# Patient Record
Sex: MALE | Race: WHITE | HISPANIC OR LATINO | Employment: FULL TIME | URBAN - METROPOLITAN AREA
[De-identification: names, ages, dates, MRNs, and addresses within clinical notes are randomized per-mention and may not be internally consistent; named-entity substitution may affect disease eponyms.]

---

## 2017-10-06 ENCOUNTER — ALLSCRIPTS OFFICE VISIT (OUTPATIENT)
Dept: OTHER | Facility: OTHER | Age: 42
End: 2017-10-06

## 2018-01-10 NOTE — PROGRESS NOTES
Chief Complaint  Patient presented for a flu shot      Active Problems    1  Allergic rhinitis (477 9) (J30 9)   2  Chronic lower back pain (724 2,338 29) (M54 5,G89 29)   3  Chronic right-sided low back pain without sciatica (724 2,338 29) (M54 5,G89 29)   4  Herpes simplex type 1 infection (054 9) (B00 9)   5  History of Plantar wart, left foot (078 12) (B07 0)    Current Meds   1  Cyclobenzaprine HCl - 10 MG Oral Tablet; Take 1 tablet twice daily; Therapy: 20Woc7203 to (Evaluate:13Jun2017)  Requested for: 49KMQ8674; Last   Rx:08Jun2017 Ordered   2  Mometasone Furoate 50 MCG/ACT Nasal Suspension; Inhale 2 sprays in each nostril   once daily; Therapy: 05PEC9247 to (Evaluate:83Ppw3185)  Requested for: 33YDQ5997; Last   Rx:62Cki7227 Ordered   3  Zovirax 5 % External Cream; apply 5 xday x 4 days; Therapy: 50ZKQ7778 to (Last Rx:64Fqc2055)  Requested for: 44LFD2193 Ordered    Allergies    1   Penicillins    Plan  Influenza vaccine needed    · Fluzone Quadrivalent Intramuscular Suspension    Signatures   Electronically signed by : Germania Israel, ; Oct  6 2017  2:37PM EST                       (Author)    Electronically signed by : Keisha Mccarty DO; Oct  6 2017  4:08PM EST                       (Author)

## 2018-01-18 NOTE — PROGRESS NOTES
Chief Complaint  PT PRESENTS FOR FLU VACCINE, TOLERATED WELL      Active Problems    1  Acute bronchitis (466 0) (J20 9)   2  Allergic conjunctivitis (372 14) (H10 10)   3  Allergic rhinitis (477 9) (J30 9)   4  Back spasm (724 8) (M62 830)   5  Cough (786 2) (R05)   6  Herpes simplex type 1 infection (054 9) (B00 9)   7  Need for prophylactic vaccination and inoculation against influenza (V04 81) (Z23)    Current Meds   1  Azithromycin 250 MG Oral Tablet; Take 2 tabs day one, then 1 tab daily x 4 days to finish; Therapy: 71RXQ6331 to (Last Rx:04Nov2016)  Requested for: 52JOW4632 Ordered   2  Cyclobenzaprine HCl - 10 MG Oral Tablet; Take 1 tablet twice daily; Therapy: 06Rcr9708 to (Sandie Loaiza)  Requested for: 10AYS4666; Last   Rx:52Byy1670 Ordered   3  Diclofenac Sodium 50 MG Oral Tablet Delayed Release; TAKE 1 TABLET Every 8 hours; Therapy: 49Kae5506 to (Evaluate:45Ypx5513)  Requested for: 90Nnx5162; Last   Rx:28Zcd7071 Ordered   4  Montelukast Sodium 10 MG Oral Tablet; 1 po daily; Therapy: 20Wed0180 to (Last Rx:62Haf2398)  Requested for: 77Sya4673 Ordered   5  Promethazine VC Plain 6 25-5 MG/5ML Oral Syrup; TAKE 5 ML EVERY 4 TO 6 HOURS   AS NEEDED; Therapy: 56MNQ8074 to (Burgess Aggarwal)  Requested for: 06BCA3760; Last   Rx:04Nov2016 Ordered   6  Zovirax 5 % External Cream; Apply to affected area 5 times a day for 4 days; Therapy: 17Xrc7670 to (Evaluate:54Uuq0301)  Requested for: 00Zvq1227; Last   Rx:00Gef2428 Ordered    Allergies    1  Penicillins    Plan  Need for prophylactic vaccination and inoculation against influenza    · Fluzone Quadrivalent Intramuscular Suspension    Signatures   Electronically signed by :  Oddis Romberg, ; Dec  2 2016  9:13AM EST                       (Author)    Electronically signed by : Justa Giraldo DO; Dec  2 2016 12:44PM EST                       (Author)

## 2018-03-05 ENCOUNTER — OFFICE VISIT (OUTPATIENT)
Dept: FAMILY MEDICINE CLINIC | Facility: CLINIC | Age: 43
End: 2018-03-05
Payer: COMMERCIAL

## 2018-03-05 VITALS
OXYGEN SATURATION: 98 % | DIASTOLIC BLOOD PRESSURE: 72 MMHG | WEIGHT: 215 LBS | HEIGHT: 71 IN | HEART RATE: 96 BPM | RESPIRATION RATE: 16 BRPM | BODY MASS INDEX: 30.1 KG/M2 | SYSTOLIC BLOOD PRESSURE: 124 MMHG

## 2018-03-05 DIAGNOSIS — L30.9 DERMATITIS: ICD-10-CM

## 2018-03-05 DIAGNOSIS — Z00.00 ANNUAL PHYSICAL EXAM: Primary | ICD-10-CM

## 2018-03-05 DIAGNOSIS — M54.50 CHRONIC RIGHT-SIDED LOW BACK PAIN WITHOUT SCIATICA: ICD-10-CM

## 2018-03-05 DIAGNOSIS — G89.29 CHRONIC RIGHT-SIDED LOW BACK PAIN WITH RIGHT-SIDED SCIATICA: ICD-10-CM

## 2018-03-05 DIAGNOSIS — M54.41 CHRONIC RIGHT-SIDED LOW BACK PAIN WITH RIGHT-SIDED SCIATICA: ICD-10-CM

## 2018-03-05 DIAGNOSIS — G89.29 CHRONIC RIGHT-SIDED LOW BACK PAIN WITHOUT SCIATICA: ICD-10-CM

## 2018-03-05 PROCEDURE — 99396 PREV VISIT EST AGE 40-64: CPT | Performed by: FAMILY MEDICINE

## 2018-03-05 PROCEDURE — 99213 OFFICE O/P EST LOW 20 MIN: CPT | Performed by: FAMILY MEDICINE

## 2018-03-05 RX ORDER — CYCLOBENZAPRINE HCL 10 MG
10 TABLET ORAL 3 TIMES DAILY PRN
Qty: 90 TABLET | Refills: 1 | Status: SHIPPED | OUTPATIENT
Start: 2018-03-05 | End: 2018-06-19

## 2018-03-05 NOTE — PROGRESS NOTES
Subjective:      Patient ID: Alvin Fox is a 40 y o  male  56-year-old male presents for annual physical examination  He generally feels well  Every now and then when he is twisting a certain way or in an awkward position he will get a spasm and pain in his back  He does have p r n  Flexeril left over from over 1 year ago which she will take intermittently which does provide relief  He was given referral to physical therapy but never pursue them  Patient also will occasionally get dermatitis on his upper lip especially after shaving  No blisters  He was applying topical Zovirax which does seem to provide some relief but never had a history of herpes simplex  Patient does need screening blood work  No other complaints or concerns        Past Medical History:   Diagnosis Date    Allergic rhinitis     Onset: 4/28/2008    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/2012    Diabetes ([address] )     Onset: 4/14/2009    Hordeolum externum unspecified eye, unspecified eyelid     Onset: 6/30/2009    IBS (irritable bowel syndrome)     Onset: 5/23/2008    Impaired fasting glucose     Onset: 4/28/2009    Plantar wart, left foot     Last Assessed: 12/29/2016    Rosacea     Onset: 3/21/2006       Family History   Problem Relation Age of Onset    No Known Problems Mother        History reviewed  No pertinent surgical history  reports that he has never smoked  He has never used smokeless tobacco     No current outpatient prescriptions on file  The following portions of the patient's history were reviewed and updated as appropriate: allergies, current medications, past family history, past medical history, past social history, past surgical history and problem list     Review of Systems   Constitutional: Negative  HENT: Negative  Eyes: Negative  Respiratory: Negative  Cardiovascular: Negative  Gastrointestinal: Negative  Endocrine: Negative  Genitourinary: Negative  Musculoskeletal: Positive for back pain ( intermittent, sporadic back pain without referral of pain)  Skin: Negative  Allergic/Immunologic: Negative  Neurological: Negative  Hematological: Negative  Psychiatric/Behavioral: Negative  All other systems reviewed and are negative  Objective:    /72 (BP Location: Left arm, Patient Position: Sitting, Cuff Size: Standard)   Pulse 96   Resp 16   Ht 5' 11" (1 803 m)   Wt 97 5 kg (215 lb)   SpO2 98%   BMI 29 99 kg/m²      Physical Exam   Constitutional: He is oriented to person, place, and time  He appears well-developed and well-nourished  HENT:   Head: Normocephalic  Eyes: Conjunctivae and EOM are normal  Pupils are equal, round, and reactive to light  Neck: Normal range of motion  Neck supple  Cardiovascular: Normal rate, regular rhythm and normal heart sounds  Pulmonary/Chest: Effort normal and breath sounds normal    Abdominal: Soft  Bowel sounds are normal    Musculoskeletal: Normal range of motion  Neurological: He is alert and oriented to person, place, and time  Skin: Skin is warm and dry  Psychiatric: He has a normal mood and affect  His behavior is normal  Judgment and thought content normal    Nursing note and vitals reviewed  No results found for this or any previous visit (from the past 1008 hour(s))  Assessment/Plan:    No problem-specific Assessment & Plan notes found for this encounter  Problem List Items Addressed This Visit     Chronic lower back pain       Patient given a refill of p r n  Flexeril  Referral to physical therapy which she will now try  I am certain that he only needs 2 or 3 sessions to get stretching and strengthening exercises in  Once he is given a regimen I am certain that he will be able to follow it on his own           Relevant Medications    cyclobenzaprine (FLEXERIL) 10 mg tablet    Other Relevant Orders    Ambulatory referral to Physical Therapy Ambulatory referral to Physical Therapy    Chronic right-sided low back pain without sciatica    Relevant Medications    cyclobenzaprine (FLEXERIL) 10 mg tablet    Other Relevant Orders    Ambulatory referral to Physical Therapy    Annual physical exam - Primary      Generally healthy 45-year-old male  He could stand to lose some weight  Check screening blood work including CBC, CMP, TSH, lipid profile  Will contact patient with results  Relevant Orders    CBC and differential    Comprehensive metabolic panel    TSH, 3rd generation with T4 reflex    Lipid panel    Dermatitis       Razor dermatitis  Patient was advised to shave with the growth of hair as opposed to against it  If he does developed slight dermatitis instead of applying Zovirax, I did give him a sample of [facility]    The topical acyclovir probably provided relief because of the moisturizing nature but he does not have herpes simplex

## 2018-03-05 NOTE — ASSESSMENT & PLAN NOTE
Generally healthy 78-year-old male  He could stand to lose some weight  Check screening blood work including CBC, CMP, TSH, lipid profile  Will contact patient with results

## 2018-03-05 NOTE — ASSESSMENT & PLAN NOTE
Patient given a refill of p r n  Flexeril  Referral to physical therapy which she will now try  I am certain that he only needs 2 or 3 sessions to get stretching and strengthening exercises in  Once he is given a regimen I am certain that he will be able to follow it on his own

## 2018-03-05 NOTE — ASSESSMENT & PLAN NOTE
Razor dermatitis  Patient was advised to shave with the growth of hair as opposed to against it  If he does developed slight dermatitis instead of applying Zovirax, I did give him a sample of [facility]    The topical acyclovir probably provided relief because of the moisturizing nature but he does not have herpes simplex

## 2018-03-07 ENCOUNTER — APPOINTMENT (OUTPATIENT)
Dept: PHYSICAL THERAPY | Facility: CLINIC | Age: 43
End: 2018-03-07
Payer: COMMERCIAL

## 2018-03-19 ENCOUNTER — EVALUATION (OUTPATIENT)
Dept: PHYSICAL THERAPY | Facility: CLINIC | Age: 43
End: 2018-03-19
Payer: COMMERCIAL

## 2018-03-19 DIAGNOSIS — G89.29 CHRONIC LOW BACK PAIN WITH RIGHT-SIDED SCIATICA, UNSPECIFIED BACK PAIN LATERALITY: Primary | ICD-10-CM

## 2018-03-19 DIAGNOSIS — M54.41 CHRONIC LOW BACK PAIN WITH RIGHT-SIDED SCIATICA, UNSPECIFIED BACK PAIN LATERALITY: Primary | ICD-10-CM

## 2018-03-19 PROCEDURE — G8978 MOBILITY CURRENT STATUS: HCPCS

## 2018-03-19 PROCEDURE — 97112 NEUROMUSCULAR REEDUCATION: CPT

## 2018-03-19 PROCEDURE — 97110 THERAPEUTIC EXERCISES: CPT

## 2018-03-19 PROCEDURE — G8979 MOBILITY GOAL STATUS: HCPCS

## 2018-03-19 PROCEDURE — 97161 PT EVAL LOW COMPLEX 20 MIN: CPT

## 2018-03-19 NOTE — PROGRESS NOTES
PT Evaluation     Today's date: 3/19/2018  Patient name: Luba Glez  : 1975  MRN: [id number]  Referring provider: Javan Zurita DO  Dx: No diagnosis found  Assessment  Impairments: abnormal coordination, abnormal muscle firing, abnormal muscle tone, abnormal or restricted ROM, abnormal movement, activity intolerance, impaired physical strength, lacks appropriate home exercise program and pain with function    Assessment details: PT is a 40y o  year old male with the impairments listed above  Patient will benefit from skilled physical therapy to address impairments and maximize function  Thank you for the referral   Understanding of Dx/Px/POC: good   Prognosis: good    Goals  Impairment Related Goals: In 8 weeks, patient will have gross hip strength improve minimum 50% or minimum 4+/5 MMT grade  In 6 weeks, patient will have improvement in lumbar extension AROM minimum 25%  In 2 weeks, patient will verbalize and demonstrate positional considerations to improve sitting posture  Functional Goals: In 8 weeks, patient will verbalize no difficulty with lifting groceries, other moderate activities around the home  In 8 weeks, patient will have no difficulty with usual housework  In 6 weeks, patient will report no pain/ compensation/ difficulty with regular recreation         Plan  Patient would benefit from: skilled PT  Planned modality interventions: cryotherapy, TENS and thermotherapy: hydrocollator packs  Planned therapy interventions: joint mobilization, manual therapy, massage, neuromuscular re-education, patient education, postural training, body mechanics training, behavior modification, balance, strengthening, stretching, therapeutic exercise, community reintegration, flexibility, gait training, graded activity, graded exercise, home exercise program, ADL training, activity modification, abdominal trunk stabilization, IADL retraining and motor coordination training  Frequency: 2x week  Duration in weeks: 8  Treatment plan discussed with: patient        Subjective Evaluation    History of Present Illness  Mechanism of injury: HX OF CURRENT CONDITION: Patient reports history of low back pain with random onset > 15 years  He reports last week he was bent over fixing the faucet and had the pain shoot up his back when he tried to stand back up  He reports he has difficulty straightening up when the incidences  He reports approximately once per year he has a flare up that his pain is so debilitating he cannot get out of bed or walk  He describes the pain as "paralyzing and sharp in nature " He reports he doesn't have pain with sitting, standing, laying  He reports the onsets tend to be when he's flexed over or completing heavy lifting  He denies pain in his legs, reporting the pain shoots "up his back " Denies hip or knee pain  SPECIAL QUESTIONS: Denies numbness/ tingling, saddle anesthesia, bowel and bladder changes  OCCUPATION: Patient works for "[employer] he reports a lot of sitting for work  HOBBIES: Basketball, sports with kids  PRECAUTIONS: None      Pain  Current pain ratin  At best pain ratin  At worst pain ratin  Quality: sharp    Patient Goals  Patient goals for therapy: increased strength  Patient goal: "core strength"        Objective     Active Range of Motion     Lumbar   Flexion: 95 degrees   Extension: 50 degrees   Left lateral flexion: 100 degrees   Right lateral flexion: 90 degrees     Additional Active Range of Motion Details  Lower Quarter Screen was negative  Light touch intact in L2-S1 dermatomes bilaterally  Knee jerk reflex 2+ bilaterally, ankle jerk reflex 2+ bilaterally    No pain with lumbar AROM        Strength/Myotome Testing     Left Hip   Planes of Motion   Extension: 4-  Abduction: 4    Right Hip   Planes of Motion   Extension: 4-  Abduction: 4    Additional Strength Details  Hip PROM WNL and symmetrical, slight limitation in IR on the R  Negative Hamstring, Mary's, Piriformis    Tests     Lumbar     Left   Negative passive SLR and vertical compression  Right   Negative passive SLR and vertical compression             Precautions: None    Daily Treatment Diary     Manual  3/19            P-A mobilizations             MWM PPU                                                        Exercise Diary              Prone Press Ups X 10            Hip abduction             Hip ER             Glute Bridges             Transverse Abdominus                                                                                                                                                                                                                    Modalities

## 2018-03-23 ENCOUNTER — OFFICE VISIT (OUTPATIENT)
Dept: PHYSICAL THERAPY | Facility: CLINIC | Age: 43
End: 2018-03-23
Payer: COMMERCIAL

## 2018-03-23 DIAGNOSIS — M54.41 CHRONIC LOW BACK PAIN WITH RIGHT-SIDED SCIATICA, UNSPECIFIED BACK PAIN LATERALITY: Primary | ICD-10-CM

## 2018-03-23 DIAGNOSIS — G89.29 CHRONIC LOW BACK PAIN WITH RIGHT-SIDED SCIATICA, UNSPECIFIED BACK PAIN LATERALITY: Primary | ICD-10-CM

## 2018-03-23 PROCEDURE — 97112 NEUROMUSCULAR REEDUCATION: CPT

## 2018-03-23 PROCEDURE — 97110 THERAPEUTIC EXERCISES: CPT

## 2018-03-23 PROCEDURE — 97140 MANUAL THERAPY 1/> REGIONS: CPT

## 2018-03-23 NOTE — PROGRESS NOTES
Daily Note     Today's date: 3/23/2018  Patient name: Jacki Nice  : 1975  MRN: [id number]  Referring provider: Adriane Wright DO  Dx:   Encounter Diagnosis     ICD-10-CM    1  Chronic low back pain with right-sided sciatica, unspecified back pain laterality M54 41     G89 29                   Subjective: Patient reports a little bit of soreness with shoveling, but no debilitating pain  Objective: See treatment diary below      Precautions: None    Daily Treatment Diary     Manual  3/23            P-A mobilizations             MWM PPU                                                        Exercise Diary              Prone Press Ups X 10            Hip abduction walking GTB 2 x 10            Hip ER 2 x 10            Glute Bridges X 20            Transverse Abdominus X 15            PN w/ SLR X 10            Multifidus Press  BTB x 15            Balance Board             Obliques             Crunches                                                                                                                                                   Modalities                                                         Assessment: Tolerated treatment well  Moderate fatigue with various Te  Patient demonstrated fatigue post treatment, exhibited good technique with therapeutic exercises and would benefit from continued PT      Plan: Give additional HEP print out NV  Continue per plan of care  Progress treatment as tolerated

## 2018-03-26 ENCOUNTER — OFFICE VISIT (OUTPATIENT)
Dept: PHYSICAL THERAPY | Facility: CLINIC | Age: 43
End: 2018-03-26
Payer: COMMERCIAL

## 2018-03-26 DIAGNOSIS — M54.41 CHRONIC LOW BACK PAIN WITH RIGHT-SIDED SCIATICA, UNSPECIFIED BACK PAIN LATERALITY: Primary | ICD-10-CM

## 2018-03-26 DIAGNOSIS — G89.29 CHRONIC LOW BACK PAIN WITH RIGHT-SIDED SCIATICA, UNSPECIFIED BACK PAIN LATERALITY: Primary | ICD-10-CM

## 2018-03-26 PROCEDURE — 97112 NEUROMUSCULAR REEDUCATION: CPT

## 2018-03-26 PROCEDURE — 97110 THERAPEUTIC EXERCISES: CPT

## 2018-03-26 PROCEDURE — 97140 MANUAL THERAPY 1/> REGIONS: CPT

## 2018-03-26 NOTE — PROGRESS NOTES
Daily Note     Today's date: 3/26/2018  Patient name: Soledad Mcconnell  : 1975  MRN: [id number]  Referring provider: Anthony Trevino DO  Dx:   Encounter Diagnosis     ICD-10-CM    1  Chronic low back pain with right-sided sciatica, unspecified back pain laterality M54 41     G89 29                   Subjective: Patient reports his back tensed and stiffened up standing at the soccer game in the cold for over an hour  He reports he will occasionally having increased pain/ shooting pains with sneezing  Objective: See treatment diary below    Daily Treatment Diary     Manual  3/23 3/26           P-A mobilizations  8 min           MWM PPU  2 min                                                      Exercise Diary              Prone Press Ups X 10 X 15           Hip abduction walking GTB 2 x 10 GTB 3 x 10           Hip ER 2 x 10 2 x 10           Glute Bridges X 20 X 10           Transverse Abdominus X 15 X 15           PN w/ SLR X 10 X 10           Multifidus Press  BTB x 15 BTB  X 15           Balance Board  90 sec each           Obliques  X 10           Crunches w/ approximation  X 20           Lumbar extension  X 15                                                                                                                                    Modalities                                                             Assessment: Tolerated treatment well  Patient reported comfort with lumbar extension stretching standing  Given home exercises  Patient demonstrated fatigue post treatment and would benefit from continued PT      Plan: Continue per plan of care  Progress treatment as tolerated

## 2018-03-30 ENCOUNTER — APPOINTMENT (OUTPATIENT)
Dept: PHYSICAL THERAPY | Facility: CLINIC | Age: 43
End: 2018-03-30
Payer: COMMERCIAL

## 2018-04-06 ENCOUNTER — OFFICE VISIT (OUTPATIENT)
Dept: PHYSICAL THERAPY | Facility: CLINIC | Age: 43
End: 2018-04-06
Payer: COMMERCIAL

## 2018-04-06 DIAGNOSIS — G89.29 CHRONIC LOW BACK PAIN WITH RIGHT-SIDED SCIATICA, UNSPECIFIED BACK PAIN LATERALITY: Primary | ICD-10-CM

## 2018-04-06 DIAGNOSIS — M54.41 CHRONIC LOW BACK PAIN WITH RIGHT-SIDED SCIATICA, UNSPECIFIED BACK PAIN LATERALITY: Primary | ICD-10-CM

## 2018-04-06 PROCEDURE — 97110 THERAPEUTIC EXERCISES: CPT

## 2018-04-06 PROCEDURE — 97140 MANUAL THERAPY 1/> REGIONS: CPT

## 2018-04-06 PROCEDURE — 97112 NEUROMUSCULAR REEDUCATION: CPT

## 2018-04-06 NOTE — PROGRESS NOTES
Daily Note     Today's date: 2018  Patient name: Severa Horseman  : 1975  MRN: [id number]  Referring provider: Magdalena Dominguez DO  Dx:   Encounter Diagnosis     ICD-10-CM    1  Chronic low back pain with right-sided sciatica, unspecified back pain laterality M54 41     G89 29                   Subjective: Patient reports no real incidences but also not straining his back  He reports in the 12 days since he's been here last, he's only done his exercises about 3-4 times  Objective: See treatment diary below    Daily Treatment Diary     Manual  3/23 3/26 4/6          P-A mobilizations  8 min 8 min          MWM PPU  2 min 1 min                                                     Exercise Diary              Prone Press Ups X 10 X 15 X 25          Hip abduction walking GTB 2 x 10 GTB 3 x 10 GTB 3 x 10          Hip ER 2 x 10 2 x 10 2 x 10          Glute Bridges X 20 X 10 X 15          Transverse Abdominus X 15 X 15 X 15          PN w/ SLR X 10 X 10 X 10          Multifidus Press  BTB x 15 BTB  X 15 BTB x 15          Balance Board  90 sec each 90 sec each          Obliques  X 10 2 X 10          Crunches w/ approximation  X 20 X 20          Lumbar extension standing  X 15           Plank   3 x 10                                                                                                                      Modalities                                                           Assessment: Tolerated treatment  fair  Significant muscle fatigue/ fasciculation noted various Te  Patient educated on importance of doing exercises regularly to build strength and improve PT outcomes  Patient demonstrated fatigue post treatment, exhibited good technique with therapeutic exercises and would benefit from continued PT       Plan:Follow up on adherence of home program, and decide if need to increase frequency to twice per week for adherence  Continue per plan of care  Progress treatment as tolerated

## 2018-04-13 ENCOUNTER — OFFICE VISIT (OUTPATIENT)
Dept: PHYSICAL THERAPY | Facility: CLINIC | Age: 43
End: 2018-04-13
Payer: COMMERCIAL

## 2018-04-13 DIAGNOSIS — G89.29 CHRONIC LOW BACK PAIN WITH RIGHT-SIDED SCIATICA, UNSPECIFIED BACK PAIN LATERALITY: Primary | ICD-10-CM

## 2018-04-13 DIAGNOSIS — M54.41 CHRONIC LOW BACK PAIN WITH RIGHT-SIDED SCIATICA, UNSPECIFIED BACK PAIN LATERALITY: Primary | ICD-10-CM

## 2018-04-13 PROCEDURE — 97110 THERAPEUTIC EXERCISES: CPT

## 2018-04-13 PROCEDURE — 97112 NEUROMUSCULAR REEDUCATION: CPT

## 2018-04-13 PROCEDURE — G8979 MOBILITY GOAL STATUS: HCPCS

## 2018-04-13 PROCEDURE — G8978 MOBILITY CURRENT STATUS: HCPCS

## 2018-04-13 PROCEDURE — 97140 MANUAL THERAPY 1/> REGIONS: CPT

## 2018-04-13 NOTE — PROGRESS NOTES
Daily Note     Today's date: 2018  Patient name: Jase Urena  : 1975  MRN: [id number]  Referring provider: Squire Gowers, DO  Dx:   Encounter Diagnosis     ICD-10-CM    1  Chronic low back pain with right-sided sciatica, unspecified back pain laterality M54 41     G89 29                   Subjective: Patient reported increased muscle soreness in his back and core, not necessarily his abdominals, - Wednesday  He contributes the soreness to increasing his exercise regimen  He reports doing the exercise routine 4-5 times the past week  Objective: See treatment diary below    Daily Treatment Diary     Manual  3/23 3/26 4/6 4/13         P-A mobilizations  8 min 8 min 8 min         MWM PPU  2 min 1 min                                                     Exercise Diary              Prone Press Ups X 10 X 15 X 25 X 15 w/ OP         Hip abduction walking GTB 2 x 10 GTB 3 x 10 GTB 3 x 10 GTB 3 x 10         Hip ER 2 x 10 2 x 10 RTB 2 x 10 RTB 2 x 10         Glute Bridges X 20 X 10 X 15 X 20         Transverse Abdominus X 15 X 15 X 15 X 15         PN w/ SLR X 10 X 10 X 10 2 x 10         Multifidus Press  BTB x 15 BTB  X 15 BTB x 15 BTB x 15         Balance Board  90 sec each 90 sec each 90 sec         Obliques  X 10 2 X 10 2 x 10         Crunches w/ approximation  X 20 X 20 X 30         Lumbar extension standing  X 15           Plank   3 x 10 3 x 15 sec                                                                                                                     Modalities                                                           Assessment: Tolerated treatment well  Patient required verbal cues and demonstrated fatigue with planks and abduction walking  Patient demonstrated fatigue post treatment and would benefit from continued PT      Plan: Continue per plan of care  Progress treatment as tolerated

## 2018-04-20 ENCOUNTER — APPOINTMENT (OUTPATIENT)
Dept: PHYSICAL THERAPY | Facility: CLINIC | Age: 43
End: 2018-04-20
Payer: COMMERCIAL

## 2018-04-27 ENCOUNTER — APPOINTMENT (OUTPATIENT)
Dept: PHYSICAL THERAPY | Facility: CLINIC | Age: 43
End: 2018-04-27
Payer: COMMERCIAL

## 2018-05-06 DIAGNOSIS — M54.41 CHRONIC RIGHT-SIDED LOW BACK PAIN WITH RIGHT-SIDED SCIATICA: ICD-10-CM

## 2018-05-06 DIAGNOSIS — G89.29 CHRONIC RIGHT-SIDED LOW BACK PAIN WITHOUT SCIATICA: ICD-10-CM

## 2018-05-06 DIAGNOSIS — G89.29 CHRONIC RIGHT-SIDED LOW BACK PAIN WITH RIGHT-SIDED SCIATICA: ICD-10-CM

## 2018-05-06 DIAGNOSIS — M54.50 CHRONIC RIGHT-SIDED LOW BACK PAIN WITHOUT SCIATICA: ICD-10-CM

## 2018-05-07 RX ORDER — CYCLOBENZAPRINE HCL 10 MG
10 TABLET ORAL 3 TIMES DAILY PRN
Qty: 90 TABLET | Refills: 1 | OUTPATIENT
Start: 2018-05-07

## 2018-05-07 NOTE — TELEPHONE ENCOUNTER
Refused refill for flexeril  Last seen in March  Went to physical therapy  Needs to complete labs that were ordered

## 2018-05-09 NOTE — PROGRESS NOTES
PT Discharge    Today's date: 2018  Patient name: August Almanzar  : 1975  MRN: [id number]  Referring provider: Stevphen Gowers, DO  Dx:   Encounter Diagnosis     ICD-10-CM    1  Chronic low back pain with right-sided sciatica, unspecified back pain laterality M54 41     G89 29        Start Time: 1230  Stop Time: 1327  Total time in clinic (min): 57 minutes    Assessment    Assessment details: Patient cancelled last two appointments, which were both scheduled to be re-evaluations and did not return phone calls to reschedule  Patient was given extensive home exercise program including stretches, hip and core strengthening in which he was encouraged to regularly complete  Discharge at this time   Thank you for the referral         Subjective    Objective    Flowsheet Rows      Most Recent Value   PT/OT G-Codes   Assessment Type  Re-evaluation   G code set  Mobility: Walking & Moving Around   Mobility: Walking and Moving Around Current Status ()  CK   Mobility: Walking and Moving Around Goal Status ()  CJ        Precautions:  None     Daily Treatment Diary     Manual  3/23 3/26 4/6          P-A mobilizations  8 min 8 min          MWM PPU  2 min 1 min                                                     Exercise Diary              Prone Press Ups X 10 X 15 X 25          Hip abduction walking GTB 2 x 10 GTB 3 x 10 GTB 3 x 10          Hip ER 2 x 10 2 x 10 2 x 10          Glute Bridges X 20 X 10 X 15          Transverse Abdominus X 15 X 15 X 15          PN w/ SLR X 10 X 10 X 10          Multifidus Press  BTB x 15 BTB  X 15 BTB x 15          Balance Board  90 sec each 90 sec each          Obliques  X 10 2 X 10          Crunches w/ approximation  X 20 X 20          Lumbar extension standing  X 15           Plank   3 x 10                                                                                                                      Modalities

## 2018-05-20 DIAGNOSIS — G89.29 CHRONIC RIGHT-SIDED LOW BACK PAIN WITHOUT SCIATICA: ICD-10-CM

## 2018-05-20 DIAGNOSIS — M54.50 CHRONIC RIGHT-SIDED LOW BACK PAIN WITHOUT SCIATICA: ICD-10-CM

## 2018-05-20 DIAGNOSIS — M54.41 CHRONIC RIGHT-SIDED LOW BACK PAIN WITH RIGHT-SIDED SCIATICA: ICD-10-CM

## 2018-05-20 DIAGNOSIS — G89.29 CHRONIC RIGHT-SIDED LOW BACK PAIN WITH RIGHT-SIDED SCIATICA: ICD-10-CM

## 2018-05-21 RX ORDER — CYCLOBENZAPRINE HCL 10 MG
10 TABLET ORAL 3 TIMES DAILY PRN
Qty: 90 TABLET | Refills: 1 | OUTPATIENT
Start: 2018-05-21

## 2018-05-21 NOTE — TELEPHONE ENCOUNTER
Once again declined refill request for Flexeril  Patient was last seen in March  Needs to complete labs that were ordered    Please notify him of this

## 2018-06-04 ENCOUNTER — OFFICE VISIT (OUTPATIENT)
Dept: FAMILY MEDICINE CLINIC | Facility: CLINIC | Age: 43
End: 2018-06-04
Payer: COMMERCIAL

## 2018-06-04 VITALS
RESPIRATION RATE: 16 BRPM | WEIGHT: 207 LBS | HEART RATE: 74 BPM | BODY MASS INDEX: 28.98 KG/M2 | HEIGHT: 71 IN | OXYGEN SATURATION: 98 % | DIASTOLIC BLOOD PRESSURE: 82 MMHG | TEMPERATURE: 98.4 F | SYSTOLIC BLOOD PRESSURE: 120 MMHG

## 2018-06-04 DIAGNOSIS — Z00.00 ANNUAL PHYSICAL EXAM: ICD-10-CM

## 2018-06-04 DIAGNOSIS — J30.1 SEASONAL ALLERGIC RHINITIS DUE TO POLLEN: Primary | ICD-10-CM

## 2018-06-04 PROCEDURE — 99213 OFFICE O/P EST LOW 20 MIN: CPT | Performed by: PHYSICIAN ASSISTANT

## 2018-06-04 RX ORDER — CETIRIZINE HYDROCHLORIDE 10 MG/1
10 TABLET ORAL DAILY
Qty: 30 TABLET | Refills: 0 | Status: SHIPPED | OUTPATIENT
Start: 2018-06-04 | End: 2018-07-01 | Stop reason: SDUPTHER

## 2018-06-04 NOTE — PROGRESS NOTES
Assessment/Plan:     Diagnoses and all orders for this visit:    Seasonal allergic rhinitis due to pollen  VS and ENT exam WNL  No evidence of sinus infection or URI  - Refilled Zyrtec to start daily  - Reprinted labs  - FU in 4 days to review labs with PCP          Subjective:    Patient ID: Jase Urena is a 40 y o  male  Pt is presenting today for Nasal congestion and sneezing for 4 days  He denies any fever, chills, itchy eyes, runny nose or cough  Pt has been taking Aleve the last several days which has not provided any relief  He states this is a little different from his his normal allergy symptoms  He was taking Cingular and Zyxal intermittently last month but nothing for the past few weeks because he is out  Regardless, he is requesting a refill of allergy medications to help him with his symptoms  Pt is requesting a refill of his Flexeril  He saw Dr Tessa Dominguez 3 months prior and has not had his labs performed as requested  He went to [facility] Physical Therapy treatments in March and April for his lower back which improved his symptoms but he is still requesting the Flerxeril  Pt has changed his diet and increased his exercise in attempt to lose weight after his most recent discussion with Dr Tessa Dominguez  The following portions of the patient's history were reviewed and updated as appropriate: allergies, current medications and problem list     Review of Systems   Constitutional: Negative for activity change, fatigue, fever and unexpected weight change  HENT: Positive for congestion and sneezing  Negative for rhinorrhea and sore throat  Respiratory: Negative for cough, chest tightness, shortness of breath and wheezing  Cardiovascular: Negative for chest pain, palpitations and leg swelling  Gastrointestinal: Negative for constipation, diarrhea, nausea and vomiting  Musculoskeletal: Negative for back pain, neck pain and neck stiffness           Objective:  /82   Pulse 74   Temp 98 4 °F (36 9 °C)   Resp 16   Ht 5' 11" (1 803 m)   Wt 93 9 kg (207 lb)   SpO2 98%   BMI 28 87 kg/m²      Physical Exam   Constitutional: He appears well-developed and well-nourished  HENT:   Head: Normocephalic and atraumatic  Right Ear: Tympanic membrane and external ear normal    Left Ear: Tympanic membrane and external ear normal    Nose: Nose normal  No rhinorrhea  Right sinus exhibits no maxillary sinus tenderness and no frontal sinus tenderness  Left sinus exhibits no maxillary sinus tenderness and no frontal sinus tenderness  Mouth/Throat: Oropharynx is clear and moist  No oropharyngeal exudate or posterior oropharyngeal erythema  Cardiovascular: Normal rate, regular rhythm and normal heart sounds  Exam reveals no gallop and no friction rub  No murmur heard  Pulmonary/Chest: Effort normal and breath sounds normal  He has no wheezes  He has no rhonchi  He has no rales  Lymphadenopathy:        Head (right side): No submental, no submandibular, no tonsillar, no preauricular and no posterior auricular adenopathy present  Head (left side): No submental, no submandibular, no tonsillar, no preauricular and no posterior auricular adenopathy present  He has no cervical adenopathy

## 2018-06-05 ENCOUNTER — TELEPHONE (OUTPATIENT)
Dept: FAMILY MEDICINE CLINIC | Facility: CLINIC | Age: 43
End: 2018-06-05

## 2018-06-05 NOTE — TELEPHONE ENCOUNTER
Patient calling and said he was in on 06/04/18   He said he had a rough night during the night and thinks he needs antibiotics  He said that he has a follow up appt scheduled for this Friday but he said that he don't think that he can wait that long  Can we please call patient back and let him know if this is something that can be done

## 2018-06-05 NOTE — TELEPHONE ENCOUNTER
Spoke with the patient  Last night he was having increased congestion and it was a hard night  He went to [facility] this afternoon and they gave him a ZPak and SoluMedrol pack  He has a FU in this office on Friday  He was asking about a refill of his Flexeril  Dr Paolo Larson will refill it after his labs are completed

## 2018-06-19 ENCOUNTER — OFFICE VISIT (OUTPATIENT)
Dept: FAMILY MEDICINE CLINIC | Facility: CLINIC | Age: 43
End: 2018-06-19
Payer: COMMERCIAL

## 2018-06-19 ENCOUNTER — APPOINTMENT (OUTPATIENT)
Dept: LAB | Facility: CLINIC | Age: 43
End: 2018-06-19
Payer: COMMERCIAL

## 2018-06-19 VITALS
OXYGEN SATURATION: 98 % | SYSTOLIC BLOOD PRESSURE: 118 MMHG | HEIGHT: 71 IN | DIASTOLIC BLOOD PRESSURE: 78 MMHG | TEMPERATURE: 98.6 F | HEART RATE: 98 BPM | RESPIRATION RATE: 16 BRPM | WEIGHT: 206 LBS | BODY MASS INDEX: 28.84 KG/M2

## 2018-06-19 DIAGNOSIS — J20.9 ACUTE BRONCHITIS, UNSPECIFIED ORGANISM: Primary | ICD-10-CM

## 2018-06-19 DIAGNOSIS — J30.1 SEASONAL ALLERGIC RHINITIS DUE TO POLLEN: ICD-10-CM

## 2018-06-19 DIAGNOSIS — J01.00 ACUTE NON-RECURRENT MAXILLARY SINUSITIS: ICD-10-CM

## 2018-06-19 LAB
BASOPHILS # BLD AUTO: 0.02 THOUSANDS/ΜL (ref 0–0.1)
BASOPHILS NFR BLD AUTO: 0 % (ref 0–1)
EOSINOPHIL # BLD AUTO: 0.1 THOUSAND/ΜL (ref 0–0.61)
EOSINOPHIL NFR BLD AUTO: 2 % (ref 0–6)
ERYTHROCYTE [DISTWIDTH] IN BLOOD BY AUTOMATED COUNT: 13.3 % (ref 11.6–15.1)
HCT VFR BLD AUTO: 45.6 % (ref 36.5–49.3)
HGB BLD-MCNC: 14.2 G/DL (ref 12–17)
IMM GRANULOCYTES # BLD AUTO: 0.04 THOUSAND/UL (ref 0–0.2)
IMM GRANULOCYTES NFR BLD AUTO: 1 % (ref 0–2)
LYMPHOCYTES # BLD AUTO: 0.97 THOUSANDS/ΜL (ref 0.6–4.47)
LYMPHOCYTES NFR BLD AUTO: 18 % (ref 14–44)
MCH RBC QN AUTO: 27.6 PG (ref 26.8–34.3)
MCHC RBC AUTO-ENTMCNC: 31.1 G/DL (ref 31.4–37.4)
MCV RBC AUTO: 89 FL (ref 82–98)
MONOCYTES # BLD AUTO: 0.67 THOUSAND/ΜL (ref 0.17–1.22)
MONOCYTES NFR BLD AUTO: 13 % (ref 4–12)
NEUTROPHILS # BLD AUTO: 3.5 THOUSANDS/ΜL (ref 1.85–7.62)
NEUTS SEG NFR BLD AUTO: 66 % (ref 43–75)
NRBC BLD AUTO-RTO: 0 /100 WBCS
PLATELET # BLD AUTO: 172 THOUSANDS/UL (ref 149–390)
PMV BLD AUTO: 12.8 FL (ref 8.9–12.7)
RBC # BLD AUTO: 5.15 MILLION/UL (ref 3.88–5.62)
WBC # BLD AUTO: 5.3 THOUSAND/UL (ref 4.31–10.16)

## 2018-06-19 PROCEDURE — 99214 OFFICE O/P EST MOD 30 MIN: CPT | Performed by: FAMILY MEDICINE

## 2018-06-19 PROCEDURE — 36415 COLL VENOUS BLD VENIPUNCTURE: CPT | Performed by: PHYSICIAN ASSISTANT

## 2018-06-19 PROCEDURE — 85025 COMPLETE CBC W/AUTO DIFF WBC: CPT | Performed by: PHYSICIAN ASSISTANT

## 2018-06-19 RX ORDER — LEVOFLOXACIN 500 MG/1
500 TABLET, FILM COATED ORAL EVERY 24 HOURS
Qty: 7 TABLET | Refills: 0 | Status: SHIPPED | OUTPATIENT
Start: 2018-06-19 | End: 2018-06-27 | Stop reason: ALTCHOICE

## 2018-06-19 RX ORDER — ALBUTEROL SULFATE 90 UG/1
2 AEROSOL, METERED RESPIRATORY (INHALATION) EVERY 6 HOURS PRN
Qty: 1 INHALER | Refills: 0 | Status: SHIPPED | OUTPATIENT
Start: 2018-06-19 | End: 2019-04-09 | Stop reason: ALTCHOICE

## 2018-06-19 NOTE — PROGRESS NOTES
Assessment/Plan:    Problem List Items Addressed This Visit        Respiratory    Allergic rhinitis     Advised to take allergy medicine regularly currently he has Zyrtec so advised to take it regularly instead of p r n ,           Acute bronchitis - Primary     He has bilateral expiratory wheezing, I will start him on inhaler and start him on Levaquin and advised to take Robitussin DM for the cough  He requested  muscle relaxant for back pain and I explained to him that is not needed, that medicine can have  Addiction potential,  And he already improved after physical therapy         Relevant Medications    albuterol (PROAIR HFA) 90 mcg/act inhaler    Acute non-recurrent maxillary sinusitis     Will treat with Levaquin         Relevant Medications    levofloxacin (LEVAQUIN) 500 mg tablet          Chief Complaint   Patient presents with    Cough    Nasal Congestion       Subjective:   Patient ID: Preeti Rdz is a 40 y o  male  He says that he has nasal congestion sinus pain and yellow-green nasal discharge for the last 3 days and bouts of cough with small amount of mucus which is mostly clear, he says he has no fever no chills he has no GI symptoms     2 weeks ago he was seen here and was diagnosed as just allergy  symptoms so he was advised to take Zyrtec, he says he only took few times he does not take it regularly  , so he went to urgent care next day and was diagnosed as bronchitis and he had wheezing so they put him on Zithromax and steroid tapering dose, he was also given Tessalon jasbir, he feels that his symptoms improved but then he is getting more congestion and he has still bouts of cough and feels mucus in his chest   And he also mentions that had been treated for back pain and he went for physical therapy and sometimes he feels some pain in the back which is not long lasting and he wants to take Flexeril if possible    He has seasonal allergies and he only takes medicine as needed in the past he used Xyzal and Singulair  Review of Systems   Constitutional: Negative for activity change, appetite change, chills, diaphoresis, fatigue, fever and unexpected weight change  HENT: Positive for congestion and sinus pressure  Negative for dental problem, ear discharge, ear pain, facial swelling, hearing loss, mouth sores, nosebleeds, postnasal drip, rhinorrhea, sinus pain, sneezing, sore throat, trouble swallowing and voice change  Eyes: Negative for photophobia, pain, discharge, redness and itching  Respiratory: Positive for cough  Negative for chest tightness, shortness of breath and wheezing  Cardiovascular: Negative for chest pain, palpitations and leg swelling  Gastrointestinal: Negative for abdominal distention, abdominal pain, blood in stool, constipation, diarrhea and nausea  Endocrine: Negative for cold intolerance, heat intolerance, polydipsia, polyphagia and polyuria  Genitourinary: Negative for dysuria, flank pain, frequency, hematuria and urgency  Musculoskeletal: Negative for arthralgias, back pain, myalgias and neck pain  Skin: Negative for color change and pallor  Allergic/Immunologic: Negative for environmental allergies and food allergies  Neurological: Negative for dizziness, weakness, light-headedness, numbness and headaches  Hematological: Negative for adenopathy  Does not bruise/bleed easily  Psychiatric/Behavioral: Negative for behavioral problems, sleep disturbance and suicidal ideas  The patient is not nervous/anxious  Objective:  Physical Exam   Constitutional: He is oriented to person, place, and time  He appears well-developed and well-nourished  HENT:   Head: Normocephalic and atraumatic  Right Ear: External ear normal    Left Ear: External ear normal    Nose: Nose normal    Mouth/Throat: Oropharynx is clear and moist  No oropharyngeal exudate     Sinus tender bilateral   Eyes: Conjunctivae and EOM are normal  Pupils are equal, round, and reactive to light  Right eye exhibits no discharge  Left eye exhibits no discharge  No scleral icterus  Neck: Normal range of motion  Neck supple  No tracheal deviation present  No thyromegaly present  Cardiovascular: Normal rate, regular rhythm and normal heart sounds  No murmur heard  Pulmonary/Chest: Effort normal  No respiratory distress  He has wheezes  He has no rales  Abdominal: Soft  Bowel sounds are normal  He exhibits no distension and no mass  There is no tenderness  There is no rebound  Musculoskeletal: Normal range of motion  He exhibits no edema  Lymphadenopathy:     He has no cervical adenopathy  Neurological: He is alert and oriented to person, place, and time  He has normal reflexes  No cranial nerve deficit  Skin: Skin is warm  No rash noted  No erythema  No pallor  Psychiatric: He has a normal mood and affect  His behavior is normal  Judgment and thought content normal    Nursing note and vitals reviewed  No past surgical history on file  Family History   Problem Relation Age of Onset    No Known Problems Mother          Current Outpatient Prescriptions:     albuterol (PROAIR HFA) 90 mcg/act inhaler, Inhale 2 puffs every 6 (six) hours as needed for wheezing, Disp: 1 Inhaler, Rfl: 0    cetirizine (ZyrTEC) 10 mg tablet, Take 1 tablet (10 mg total) by mouth daily, Disp: 30 tablet, Rfl: 0    levofloxacin (LEVAQUIN) 500 mg tablet, Take 1 tablet (500 mg total) by mouth every 24 hours for 7 days, Disp: 7 tablet, Rfl: 0    Allergies   Allergen Reactions    Penicillins      Reaction Date: [id number];   Unknown- infant allergy     Pollen Extract        Vitals:    06/19/18 0859   BP: 118/78   Pulse: 98   Resp: 16   Temp: 98 6 °F (37 °C)   SpO2: 98%   Weight: 93 4 kg (206 lb)   Height: 5' 11" (1 803 m)

## 2018-06-19 NOTE — ASSESSMENT & PLAN NOTE
He has bilateral expiratory wheezing, I will start him on inhaler and start him on Levaquin and advised to take Robitussin DM for the cough  He requested  muscle relaxant for back pain and I explained to him that is not needed, that medicine can have  Addiction potential,  And he already improved after physical therapy

## 2018-06-19 NOTE — ASSESSMENT & PLAN NOTE
Advised to take allergy medicine regularly currently he has Zyrtec so advised to take it regularly instead of p r n ,

## 2018-06-19 NOTE — ASSESSMENT & PLAN NOTE
Pain is much better sometimes he gets pain and I refused to give him muscle relaxant because of the side effects of medication

## 2018-06-27 ENCOUNTER — OFFICE VISIT (OUTPATIENT)
Dept: FAMILY MEDICINE CLINIC | Facility: CLINIC | Age: 43
End: 2018-06-27
Payer: COMMERCIAL

## 2018-06-27 VITALS
OXYGEN SATURATION: 98 % | HEIGHT: 71 IN | DIASTOLIC BLOOD PRESSURE: 80 MMHG | RESPIRATION RATE: 18 BRPM | BODY MASS INDEX: 28.84 KG/M2 | SYSTOLIC BLOOD PRESSURE: 122 MMHG | HEART RATE: 82 BPM | WEIGHT: 206 LBS | TEMPERATURE: 98.2 F

## 2018-06-27 DIAGNOSIS — J30.1 SEASONAL ALLERGIC RHINITIS DUE TO POLLEN: ICD-10-CM

## 2018-06-27 DIAGNOSIS — B00.1 COLD SORE: ICD-10-CM

## 2018-06-27 DIAGNOSIS — J20.9 ACUTE BRONCHITIS, UNSPECIFIED ORGANISM: Primary | ICD-10-CM

## 2018-06-27 PROCEDURE — 1036F TOBACCO NON-USER: CPT | Performed by: FAMILY MEDICINE

## 2018-06-27 PROCEDURE — 99214 OFFICE O/P EST MOD 30 MIN: CPT | Performed by: FAMILY MEDICINE

## 2018-06-27 PROCEDURE — 3008F BODY MASS INDEX DOCD: CPT | Performed by: FAMILY MEDICINE

## 2018-06-27 RX ORDER — BUDESONIDE AND FORMOTEROL FUMARATE DIHYDRATE 80; 4.5 UG/1; UG/1
2 AEROSOL RESPIRATORY (INHALATION) 2 TIMES DAILY
Qty: 1 INHALER | Refills: 0 | Status: SHIPPED | COMMUNITY
Start: 2018-06-27 | End: 2019-04-09 | Stop reason: ALTCHOICE

## 2018-06-27 RX ORDER — MOMETASONE FUROATE 50 UG/1
2 SPRAY, METERED NASAL DAILY
Qty: 17 G | Refills: 0 | Status: SHIPPED | OUTPATIENT
Start: 2018-06-27 | End: 2019-04-09 | Stop reason: SDUPTHER

## 2018-06-27 NOTE — ASSESSMENT & PLAN NOTE
There is no active cold sore, I discussed with him that acyclovir ointment is not a preferred in his insurance plan so he can use acyclovir tablets by he does not want to take that, he will use over-the-counter Abreva, prn

## 2018-06-27 NOTE — ASSESSMENT & PLAN NOTE
Symptoms improved but he still has minimal wheezing in the left side, advised to use Symbicort for 1-2 weeks and rinse his mouth after the use, is sample is given  , if he continue having wheezing he should follow up    He can use albuterol inhaler as a rescue inhaler  He is nonsmoker

## 2018-06-27 NOTE — PROGRESS NOTES
Assessment/Plan:    Problem List Items Addressed This Visit        Digestive    Cold sore     There is no active cold sore, I discussed with him that acyclovir ointment is not a preferred in his insurance plan so he can use acyclovir tablets by he does not want to take that, he will use over-the-counter Abreva, prn            Respiratory    Allergic rhinitis     Advised to continue Zyrtec and use Nasonex         Relevant Medications    mometasone (NASONEX) 50 mcg/act nasal spray    Acute bronchitis - Primary     Symptoms improved but he still has minimal wheezing in the left side, advised to use Symbicort for 1-2 weeks and rinse his mouth after the use, is sample is given  , if he continue having wheezing he should follow up  He can use albuterol inhaler as a rescue inhaler  He is nonsmoker         Relevant Medications    budesonide-formoterol (SYMBICORT) 80-4 5 MCG/ACT inhaler    mometasone (NASONEX) 50 mcg/act nasal spray          Chief Complaint   Patient presents with    Follow-up    Cough       Subjective:   Patient ID: Jason Bianchi is a 40 y o  male  The patient is a 40year old male here for a recheck of wheezing  One week ago he was diagnosed with acute sinusitis and bronchitis  He was given Levaquin for 7 days and an albuterol inhaler to use twice a day  He has also been taking Zyrtec daily  He has noticed some improvement in his symptoms  He reports still having a mild cough that is worse at night and the morning  He states compared to how his cough was previously, it is now less violent  He notices a decrease in his wheezing, it is now intermittent and only with forceful exhale  He still has nasal congestion but denies sinus pressure or headache  He denies fever, chills, ear pain, sore throat  shortness of breath, chest pain, nausea, vomiting  He has used Nasonex in the past that has given him relief  He notes his last prescription for that was a few years ago     He has a history of cold sores and notes he is out of medication  He denies active lesion  He used zovirox in the past local ointment  Review of Systems   Constitutional: Negative for chills, diaphoresis and fever  HENT: Positive for congestion and mouth sores (cold sores)  Negative for ear pain, sinus pain and sore throat  Respiratory: Positive for cough and wheezing (intermittent)  Negative for chest tightness and shortness of breath  Cardiovascular: Negative for chest pain  Gastrointestinal: Negative for abdominal pain, diarrhea, nausea and vomiting  Skin: Negative for color change and pallor  Allergic/Immunologic: Positive for environmental allergies  Neurological: Negative for headaches  Hematological: Negative for adenopathy  Objective:  Physical Exam   Constitutional: He appears well-developed and well-nourished  HENT:   Head: Normocephalic and atraumatic  Right Ear: Tympanic membrane, external ear and ear canal normal    Left Ear: Tympanic membrane, external ear and ear canal normal    Mouth/Throat: Oropharynx is clear and moist    Eyes: Conjunctivae and EOM are normal  Pupils are equal, round, and reactive to light  No scleral icterus  Neck: Normal range of motion  Neck supple  Cardiovascular: Normal rate, regular rhythm and normal heart sounds  Pulmonary/Chest: Effort normal  No respiratory distress  He has wheezes (intermittent mild expiratory wheeze)  He has no rales  Lymphadenopathy:     He has no cervical adenopathy  Neurological: He is alert  Skin: No rash noted  No erythema  Psychiatric: He has a normal mood and affect  No past surgical history on file      Family History   Problem Relation Age of Onset    No Known Problems Mother          Current Outpatient Prescriptions:     albuterol (PROAIR HFA) 90 mcg/act inhaler, Inhale 2 puffs every 6 (six) hours as needed for wheezing, Disp: 1 Inhaler, Rfl: 0    cetirizine (ZyrTEC) 10 mg tablet, Take 1 tablet (10 mg total) by mouth daily, Disp: 30 tablet, Rfl: 0    budesonide-formoterol (SYMBICORT) 80-4 5 MCG/ACT inhaler, Inhale 2 puffs 2 (two) times a day Rinse mouth after use , Disp: 1 Inhaler, Rfl: 0    mometasone (NASONEX) 50 mcg/act nasal spray, 2 sprays into each nostril daily, Disp: 17 g, Rfl: 0    Allergies   Allergen Reactions    Penicillins      Reaction Date: [id number];   Unknown- infant allergy     Pollen Extract        Vitals:    06/27/18 0846   BP: 122/80   Pulse: 82   Resp: 18   Temp: 98 2 °F (36 8 °C)   SpO2: 98%   Weight: 93 4 kg (206 lb)   Height: 5' 11" (1 803 m)

## 2018-07-01 DIAGNOSIS — J30.1 SEASONAL ALLERGIC RHINITIS DUE TO POLLEN: ICD-10-CM

## 2018-07-02 RX ORDER — CETIRIZINE HYDROCHLORIDE 10 MG/1
TABLET ORAL
Qty: 30 TABLET | Refills: 0 | Status: SHIPPED | OUTPATIENT
Start: 2018-07-02 | End: 2018-08-12 | Stop reason: SDUPTHER

## 2018-08-12 DIAGNOSIS — J30.1 SEASONAL ALLERGIC RHINITIS DUE TO POLLEN: ICD-10-CM

## 2018-08-13 RX ORDER — CETIRIZINE HYDROCHLORIDE 10 MG/1
TABLET ORAL
Qty: 30 TABLET | Refills: 0 | Status: SHIPPED | OUTPATIENT
Start: 2018-08-13 | End: 2019-04-09 | Stop reason: SDUPTHER

## 2019-04-09 ENCOUNTER — OFFICE VISIT (OUTPATIENT)
Dept: FAMILY MEDICINE CLINIC | Facility: CLINIC | Age: 44
End: 2019-04-09
Payer: COMMERCIAL

## 2019-04-09 VITALS
RESPIRATION RATE: 16 BRPM | SYSTOLIC BLOOD PRESSURE: 120 MMHG | BODY MASS INDEX: 27.1 KG/M2 | HEIGHT: 71 IN | WEIGHT: 193.6 LBS | DIASTOLIC BLOOD PRESSURE: 80 MMHG | HEART RATE: 76 BPM | OXYGEN SATURATION: 99 %

## 2019-04-09 DIAGNOSIS — M54.50 CHRONIC RIGHT-SIDED LOW BACK PAIN WITHOUT SCIATICA: ICD-10-CM

## 2019-04-09 DIAGNOSIS — Z00.00 ANNUAL PHYSICAL EXAM: ICD-10-CM

## 2019-04-09 DIAGNOSIS — J30.1 SEASONAL ALLERGIC RHINITIS DUE TO POLLEN: Primary | ICD-10-CM

## 2019-04-09 DIAGNOSIS — G89.29 CHRONIC RIGHT-SIDED LOW BACK PAIN WITHOUT SCIATICA: ICD-10-CM

## 2019-04-09 PROBLEM — J01.00 ACUTE NON-RECURRENT MAXILLARY SINUSITIS: Status: RESOLVED | Noted: 2018-06-19 | Resolved: 2019-04-09

## 2019-04-09 PROBLEM — J20.9 ACUTE BRONCHITIS: Status: RESOLVED | Noted: 2018-06-19 | Resolved: 2019-04-09

## 2019-04-09 PROCEDURE — 3008F BODY MASS INDEX DOCD: CPT | Performed by: PHYSICIAN ASSISTANT

## 2019-04-09 PROCEDURE — 99214 OFFICE O/P EST MOD 30 MIN: CPT | Performed by: PHYSICIAN ASSISTANT

## 2019-04-09 PROCEDURE — 1036F TOBACCO NON-USER: CPT | Performed by: PHYSICIAN ASSISTANT

## 2019-04-09 RX ORDER — MOMETASONE FUROATE 50 UG/1
2 SPRAY, METERED NASAL DAILY
Qty: 17 G | Refills: 0 | Status: SHIPPED | OUTPATIENT
Start: 2019-04-09 | End: 2021-04-22

## 2019-04-09 RX ORDER — CETIRIZINE HYDROCHLORIDE 10 MG/1
10 TABLET ORAL DAILY
Qty: 90 TABLET | Refills: 0 | Status: SHIPPED | OUTPATIENT
Start: 2019-04-09 | End: 2021-04-22

## 2019-04-09 RX ORDER — CYCLOBENZAPRINE HCL 10 MG
10 TABLET ORAL 3 TIMES DAILY PRN
Qty: 90 TABLET | Refills: 0 | Status: SHIPPED | OUTPATIENT
Start: 2019-04-09 | End: 2021-04-22

## 2019-05-28 DIAGNOSIS — G89.29 CHRONIC RIGHT-SIDED LOW BACK PAIN WITHOUT SCIATICA: ICD-10-CM

## 2019-05-28 DIAGNOSIS — M54.50 CHRONIC RIGHT-SIDED LOW BACK PAIN WITHOUT SCIATICA: ICD-10-CM

## 2019-05-28 RX ORDER — CYCLOBENZAPRINE HCL 10 MG
TABLET ORAL
Qty: 90 TABLET | Refills: 0 | OUTPATIENT
Start: 2019-05-28

## 2019-08-20 ENCOUNTER — APPOINTMENT (OUTPATIENT)
Dept: LAB | Facility: CLINIC | Age: 44
End: 2019-08-20
Payer: COMMERCIAL

## 2019-08-20 ENCOUNTER — OFFICE VISIT (OUTPATIENT)
Dept: FAMILY MEDICINE CLINIC | Facility: CLINIC | Age: 44
End: 2019-08-20
Payer: COMMERCIAL

## 2019-08-20 VITALS
RESPIRATION RATE: 16 BRPM | BODY MASS INDEX: 25.48 KG/M2 | SYSTOLIC BLOOD PRESSURE: 100 MMHG | HEIGHT: 71 IN | DIASTOLIC BLOOD PRESSURE: 70 MMHG | HEART RATE: 67 BPM | WEIGHT: 182 LBS | OXYGEN SATURATION: 99 %

## 2019-08-20 DIAGNOSIS — Z00.00 ENCOUNTER FOR PREVENTIVE HEALTH EXAMINATION: Primary | ICD-10-CM

## 2019-08-20 DIAGNOSIS — Z11.59 NEED FOR HEPATITIS C SCREENING TEST: ICD-10-CM

## 2019-08-20 DIAGNOSIS — Z13.6 SCREENING FOR CARDIOVASCULAR CONDITION: ICD-10-CM

## 2019-08-20 DIAGNOSIS — Z13.1 SCREENING FOR DIABETES MELLITUS: ICD-10-CM

## 2019-08-20 DIAGNOSIS — Z11.3 SCREENING FOR STDS (SEXUALLY TRANSMITTED DISEASES): ICD-10-CM

## 2019-08-20 DIAGNOSIS — B00.1 RECURRENT COLD SORES: ICD-10-CM

## 2019-08-20 DIAGNOSIS — Z00.00 ANNUAL PHYSICAL EXAM: ICD-10-CM

## 2019-08-20 DIAGNOSIS — Z11.4 SCREENING FOR HIV (HUMAN IMMUNODEFICIENCY VIRUS): ICD-10-CM

## 2019-08-20 DIAGNOSIS — B07.0 PLANTAR WART, RIGHT FOOT: ICD-10-CM

## 2019-08-20 DIAGNOSIS — E66.3 OVERWEIGHT (BMI 25.0-29.9): ICD-10-CM

## 2019-08-20 DIAGNOSIS — B07.9 VIRAL WART ON FINGER: ICD-10-CM

## 2019-08-20 DIAGNOSIS — M77.01: ICD-10-CM

## 2019-08-20 LAB
ALBUMIN SERPL BCP-MCNC: 4.2 G/DL (ref 3.5–5)
ALP SERPL-CCNC: 58 U/L (ref 46–116)
ALT SERPL W P-5'-P-CCNC: 48 U/L (ref 12–78)
ANION GAP SERPL CALCULATED.3IONS-SCNC: 8 MMOL/L (ref 4–13)
AST SERPL W P-5'-P-CCNC: 22 U/L (ref 5–45)
BILIRUB SERPL-MCNC: 0.92 MG/DL (ref 0.2–1)
BUN SERPL-MCNC: 22 MG/DL (ref 5–25)
CALCIUM SERPL-MCNC: 9.3 MG/DL (ref 8.3–10.1)
CHLORIDE SERPL-SCNC: 108 MMOL/L (ref 100–108)
CHOLEST SERPL-MCNC: 175 MG/DL (ref 50–200)
CO2 SERPL-SCNC: 27 MMOL/L (ref 21–32)
CREAT SERPL-MCNC: 1.02 MG/DL (ref 0.6–1.3)
ERYTHROCYTE [DISTWIDTH] IN BLOOD BY AUTOMATED COUNT: 13.2 % (ref 11.6–15.1)
EST. AVERAGE GLUCOSE BLD GHB EST-MCNC: 103 MG/DL
GFR SERPL CREATININE-BSD FRML MDRD: 90 ML/MIN/1.73SQ M
GLUCOSE P FAST SERPL-MCNC: 96 MG/DL (ref 65–99)
HBA1C MFR BLD: 5.2 % (ref 4.2–6.3)
HCT VFR BLD AUTO: 44 % (ref 36.5–49.3)
HDLC SERPL-MCNC: 40 MG/DL (ref 40–60)
HGB BLD-MCNC: 14 G/DL (ref 12–17)
LDLC SERPL CALC-MCNC: 114 MG/DL (ref 0–100)
MCH RBC QN AUTO: 27.9 PG (ref 26.8–34.3)
MCHC RBC AUTO-ENTMCNC: 31.8 G/DL (ref 31.4–37.4)
MCV RBC AUTO: 88 FL (ref 82–98)
NONHDLC SERPL-MCNC: 135 MG/DL
PLATELET # BLD AUTO: 162 THOUSANDS/UL (ref 149–390)
PMV BLD AUTO: 13.2 FL (ref 8.9–12.7)
POTASSIUM SERPL-SCNC: 4.5 MMOL/L (ref 3.5–5.3)
PROT SERPL-MCNC: 7.7 G/DL (ref 6.4–8.2)
RBC # BLD AUTO: 5.02 MILLION/UL (ref 3.88–5.62)
SODIUM SERPL-SCNC: 143 MMOL/L (ref 136–145)
TRIGL SERPL-MCNC: 104 MG/DL
TSH SERPL DL<=0.05 MIU/L-ACNC: 1.33 UIU/ML (ref 0.36–3.74)
WBC # BLD AUTO: 4.99 THOUSAND/UL (ref 4.31–10.16)

## 2019-08-20 PROCEDURE — 86803 HEPATITIS C AB TEST: CPT

## 2019-08-20 PROCEDURE — 87340 HEPATITIS B SURFACE AG IA: CPT

## 2019-08-20 PROCEDURE — 85027 COMPLETE CBC AUTOMATED: CPT

## 2019-08-20 PROCEDURE — 83036 HEMOGLOBIN GLYCOSYLATED A1C: CPT

## 2019-08-20 PROCEDURE — 86592 SYPHILIS TEST NON-TREP QUAL: CPT

## 2019-08-20 PROCEDURE — 84443 ASSAY THYROID STIM HORMONE: CPT

## 2019-08-20 PROCEDURE — 36415 COLL VENOUS BLD VENIPUNCTURE: CPT

## 2019-08-20 PROCEDURE — 99396 PREV VISIT EST AGE 40-64: CPT | Performed by: FAMILY MEDICINE

## 2019-08-20 PROCEDURE — 87591 N.GONORRHOEAE DNA AMP PROB: CPT

## 2019-08-20 PROCEDURE — 87491 CHLMYD TRACH DNA AMP PROBE: CPT

## 2019-08-20 PROCEDURE — 80061 LIPID PANEL: CPT

## 2019-08-20 PROCEDURE — 87389 HIV-1 AG W/HIV-1&-2 AB AG IA: CPT

## 2019-08-20 PROCEDURE — 80053 COMPREHEN METABOLIC PANEL: CPT

## 2019-08-20 RX ORDER — ACYCLOVIR 50 MG/G
OINTMENT TOPICAL
Qty: 15 G | Refills: 2 | Status: SHIPPED | OUTPATIENT
Start: 2019-08-20 | End: 2021-04-22

## 2019-08-20 NOTE — PATIENT INSTRUCTIONS

## 2019-08-20 NOTE — ASSESSMENT & PLAN NOTE
Wart on little finger of the right foot, advised to have cryotherapy and he will follow up and will do the treatment, discussed with him he might need few treatments

## 2019-08-20 NOTE — ASSESSMENT & PLAN NOTE
Advised ice packs 3 times a day, with naproxen, 500 milligram t i d  for few days, and brace to avoid straining to the elbow and follow-up if not better

## 2019-08-20 NOTE — ASSESSMENT & PLAN NOTE
He gets intermittently cold sores on the lips, he wants zovirox, he says he will buy it even if is not covered by insurance

## 2019-08-20 NOTE — PROGRESS NOTES
ADULT ANNUAL PHYSICAL  [facility]    NAME: Luis Manuel Suresh  AGE: 44 y o  SEX: male  : 1975     DATE: 2019     Assessment and Plan:     Problem List Items Addressed This Visit        Digestive    Recurrent cold sores     He gets intermittently cold sores on the lips, he wants zovirox, he says he will buy it even if is not covered by insurance         Relevant Medications    acyclovir (ZOVIRAX) 5 % ointment       Musculoskeletal and Integument    Plantar wart, right foot     Wart on little finger of the right foot, advised to have cryotherapy and he will follow up and will do the treatment, discussed with him he might need few treatments         Relevant Medications    acyclovir (ZOVIRAX) 5 % ointment    Golfers elbow, right     Advised ice packs 3 times a day, with naproxen, 500 milligram t i d  for few days, and brace to avoid straining to the elbow and follow-up if not better         Viral wart on finger     Wart on rt hand middle finger pulp,  Advised to come back for wart treatment with cryotherapy         Relevant Medications    acyclovir (ZOVIRAX) 5 % ointment       Other    Screening for STDs (sexually transmitted diseases) - Primary     He is , and he says just in case, he wants to be tested for STDs         Relevant Orders    Hepatitis B surface antigen    RPR    Urine Chlamydia/GC amplified DNA by PCR    Need for hepatitis C screening test    Relevant Orders    Hepatitis C antibody    Overweight (BMI 25 0-29  9)     Continue working on exercise and diet with low-fat               Immunizations and preventive care screenings were discussed with patient today  Appropriate education was printed on patient's after visit summary  He is up-to-date on immunization  Counseling:  · BMI Counseling: Body mass index is 25 38 kg/m²  Discussed the patient's BMI with him  The BMI is above average   BMI counseling and education was provided to the patient  Nutrition recommendations include reducing portion sizes, decreasing overall calorie intake, 3-5 servings of fruits/vegetables daily, reducing fast food intake, consuming healthier snacks, decreasing soda and/or juice intake, moderation in carbohydrate intake, increasing intake of lean protein, reducing intake of saturated fat and trans fat and reducing intake of cholesterol  Exercise recommendations include moderate aerobic physical activity for 150 minutes/week  Return in about 1 week (around 8/27/2019) for lab review and wart tx on hand and foot , cryo therapy  Chief Complaint:     Chief Complaint   Patient presents with    Physical Exam      History of Present Illness:     Adult Annual Physical   Patient here for a comprehensive physical exam  The patient reports problems - rt elbow pain and wart rt foot little finger   Diet and Physical Activity  · Diet/Nutrition: well balanced diet  · Exercise: moderate cardiovascular exercise and strength training exercises  Depression Screening  PHQ-9 Depression Screening    PHQ-9:    Frequency of the following problems over the past two weeks:       Little interest or pleasure in doing things:  0 - not at all  Feeling down, depressed, or hopeless:  0 - not at all  PHQ-2 Score:  0       General Health  · Sleep: sleeps well  · Hearing: normal - bilateral   · Vision: no vision problems  · Dental: regular dental visits   Health  · Symptoms include: none     Review of Systems:     Review of Systems   Constitutional: Negative for activity change, appetite change, chills, diaphoresis, fatigue, fever and unexpected weight change  HENT: Negative for congestion, dental problem, ear discharge, ear pain, facial swelling, hearing loss, mouth sores, nosebleeds, postnasal drip, rhinorrhea, sinus pressure, sinus pain, sneezing, sore throat, trouble swallowing and voice change  Eyes: Negative for photophobia, pain, discharge, redness and itching  Respiratory: Negative for cough, chest tightness, shortness of breath and wheezing  Cardiovascular: Negative for chest pain, palpitations and leg swelling  Gastrointestinal: Negative for abdominal distention, abdominal pain, blood in stool, constipation, diarrhea and nausea  Endocrine: Negative for cold intolerance, heat intolerance, polydipsia, polyphagia and polyuria  Genitourinary: Negative for dysuria, flank pain, frequency, hematuria and urgency  Musculoskeletal: Negative for arthralgias, back pain, myalgias and neck pain  Discomfort in the medial part of the right elbow for few weeks, he is right-handed person   Skin: Negative for color change and pallor  He has and dry type of skin , hard on right foot little finger and on the middle finger of the right hand   Allergic/Immunologic: Negative for environmental allergies and food allergies  Neurological: Negative for dizziness, weakness, light-headedness, numbness and headaches  Hematological: Negative for adenopathy  Does not bruise/bleed easily  Psychiatric/Behavioral: Negative for behavioral problems, sleep disturbance and suicidal ideas  The patient is not nervous/anxious  Past Medical History:     Past Medical History:   Diagnosis Date    Allergic rhinitis     Onset: 4/28/2008    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/2012    Diabetes ([facility] [address] )     Onset: 4/14/2009    Hordeolum externum unspecified eye, unspecified eyelid     Onset: 6/30/2009    IBS (irritable bowel syndrome)     Onset: 5/23/2008    Impaired fasting glucose     Onset: 4/28/2009    Plantar wart, left foot     Last Assessed: 12/29/2016    Rosacea     Onset: 3/21/2006      Past Surgical History:     History reviewed  No pertinent surgical history     Family History:     Family History   Problem Relation Age of Onset    No Known Problems Mother       Social History:     Social History     Socioeconomic History    Marital status: /Civil Union     Spouse name: None    Number of children: None    Years of education: None    Highest education level: None   Occupational History    None   Social Needs    Financial resource strain: None    Food insecurity:     Worry: None     Inability: None    Transportation needs:     Medical: None     Non-medical: None   Tobacco Use    Smoking status: Never Smoker    Smokeless tobacco: Never Used   Substance and Sexual Activity    Alcohol use: None    Drug use: None    Sexual activity: None   Lifestyle    Physical activity:     Days per week: None     Minutes per session: None    Stress: None   Relationships    Social connections:     Talks on phone: None     Gets together: None     Attends [employer] service: None     Active member of club or organization: None     Attends meetings of clubs or organizations: None     Relationship status: None    Intimate partner violence:     Fear of current or ex partner: None     Emotionally abused: None     Physically abused: None     Forced sexual activity: None   Other Topics Concern    None   Social History Narrative    Caffeine use      Current Medications:     Current Outpatient Medications   Medication Sig Dispense Refill    cetirizine (ZyrTEC) 10 mg tablet Take 1 tablet (10 mg total) by mouth daily 90 tablet 0    cyclobenzaprine (FLEXERIL) 10 mg tablet Take 1 tablet (10 mg total) by mouth 3 (three) times a day as needed for muscle spasms 90 tablet 0    mometasone (NASONEX) 50 mcg/act nasal spray 2 sprays into each nostril daily 17 g 0    acyclovir (ZOVIRAX) 5 % ointment Apply topically every 3 (three) hours 15 g 2     No current facility-administered medications for this visit  Allergies: Allergies   Allergen Reactions    Penicillins      Reaction Date: [id number];   Unknown- infant allergy     Pollen Extract       Physical Exam:     /70   Pulse 67   Resp 16   Ht 5' 11" (1 803 m)   Wt 82 6 kg (182 lb)   SpO2 99%   BMI 25 38 kg/m² Physical Exam   Constitutional: He is oriented to person, place, and time  He appears well-developed and well-nourished  HENT:   Head: Normocephalic and atraumatic  Right Ear: External ear normal    Left Ear: External ear normal    Nose: Nose normal    Mouth/Throat: Oropharynx is clear and moist  No oropharyngeal exudate  Eyes: Pupils are equal, round, and reactive to light  Conjunctivae and EOM are normal  Right eye exhibits no discharge  Left eye exhibits no discharge  No scleral icterus  Neck: Normal range of motion  Neck supple  No tracheal deviation present  No thyromegaly present  Cardiovascular: Normal rate, regular rhythm and normal heart sounds  No murmur heard  Pulmonary/Chest: Effort normal and breath sounds normal  No respiratory distress  He has no wheezes  He has no rales  Abdominal: Soft  Bowel sounds are normal  He exhibits no distension and no mass  There is no tenderness  There is no rebound  Musculoskeletal: Normal range of motion  He exhibits no edema  Wart on tip of rt hand middle finger    Lymphadenopathy:     He has no cervical adenopathy  Neurological: He is alert and oriented to person, place, and time  He has normal reflexes  No cranial nerve deficit  Skin: Skin is warm  No rash noted  No erythema  No pallor  Wart on rt little finger medial side on rt little toe  Psychiatric: He has a normal mood and affect  His behavior is normal  Judgment and thought content normal    Nursing note and vitals reviewed        Joyce Da Silva MD  [place] [id number]

## 2019-08-21 DIAGNOSIS — J20.9 ACUTE BRONCHITIS, UNSPECIFIED ORGANISM: ICD-10-CM

## 2019-08-21 LAB
HBV SURFACE AG SER QL: NORMAL
HCV AB SER QL: NORMAL
HIV 1+2 AB+HIV1 P24 AG SERPL QL IA: NORMAL
RPR SER QL: NORMAL

## 2019-08-22 ENCOUNTER — TELEPHONE (OUTPATIENT)
Dept: FAMILY MEDICINE CLINIC | Facility: CLINIC | Age: 44
End: 2019-08-22

## 2019-08-22 NOTE — TELEPHONE ENCOUNTER
Patient told me yesterday that he pays out of pocket ,he already knew it will be not covered, please inform the patient

## 2019-08-23 LAB
C TRACH DNA SPEC QL NAA+PROBE: NEGATIVE
N GONORRHOEA DNA SPEC QL NAA+PROBE: NEGATIVE

## 2019-09-12 ENCOUNTER — OFFICE VISIT (OUTPATIENT)
Dept: FAMILY MEDICINE CLINIC | Facility: CLINIC | Age: 44
End: 2019-09-12
Payer: COMMERCIAL

## 2019-09-12 VITALS
HEIGHT: 71 IN | SYSTOLIC BLOOD PRESSURE: 104 MMHG | RESPIRATION RATE: 16 BRPM | BODY MASS INDEX: 25.76 KG/M2 | OXYGEN SATURATION: 98 % | WEIGHT: 184 LBS | HEART RATE: 72 BPM | DIASTOLIC BLOOD PRESSURE: 70 MMHG

## 2019-09-12 DIAGNOSIS — B07.0 PLANTAR WART, RIGHT FOOT: ICD-10-CM

## 2019-09-12 DIAGNOSIS — B07.9 VIRAL WART ON FINGER: Primary | ICD-10-CM

## 2019-09-12 DIAGNOSIS — B07.0 PLANTAR WART, LEFT FOOT: ICD-10-CM

## 2019-09-12 PROCEDURE — 17110 DESTRUCTION B9 LES UP TO 14: CPT | Performed by: FAMILY MEDICINE

## 2019-09-12 PROCEDURE — 99213 OFFICE O/P EST LOW 20 MIN: CPT | Performed by: FAMILY MEDICINE

## 2019-09-12 NOTE — PROGRESS NOTES
Assessment/Plan:    Problem List Items Addressed This Visit        Musculoskeletal and Integument    Plantar wart, right foot    Relevant Orders    Lesion Destruction    Lesion Destruction    Plantar wart, left foot    Relevant Orders    Lesion Destruction    Lesion Destruction       Other    Viral wart on finger - Primary    Relevant Orders    Lesion Destruction    Lesion Destruction       Labs reviewed are normal    Chief Complaint   Patient presents with    skin procedure       Subjective:   Patient ID: Luis Manuel Suresh is a 44 y o  male  He is here for wart removal, he has 3 warts for more than 6 months to 1 feet and 1 on hand  1 wart on the right foot little finger on the medial part  1 wart on the left foot toe  1 wart on rt hand middle finger   Had labs done recently        Lesion Destruction  Date/Time: 9/12/2019 8:52 AM  Performed by: Radha Wynne MD  Authorized by: Radha Wynne MD     Procedure Details - Lesion Destruction:     Number of Lesions:  3  Lesion 1:     Body area:  Lower extremity    Lower extremity location:  R little toe (and left foot )    Initial size (mm):  1    Malignancy: benign lesion      Destruction method: cryotherapy    Lesion 2:     Body area:  Lower extremity    Lower extremity location:  L foot    Initial size (mm):  1    Malignancy: benign lesion      Destruction method: cryotherapy      Destruction method comment:  Scraping done before cryo  Lesion 3:     Body area:  Upper extremity    Upper extremity location:  R long finger    Initial size (mm):  1    Final defect size (mm):  1    Malignancy: benign lesion      Destruction method: cryotherapy    Lesion 6:      Area cleaned with alcohol and scraping done with scalpel and cryo done   F/u 10 days         Review of Systems   Constitutional: Negative  HENT: Negative  Eyes: Negative  Respiratory: Negative  Cardiovascular: Negative             Objective:  Physical Exam   Constitutional: He appears well-developed and well-nourished  HENT:   Head: Normocephalic and atraumatic  Eyes: Conjunctivae are normal  No scleral icterus  Neck: No thyromegaly present  Cardiovascular: Normal rate and regular rhythm  Pulmonary/Chest: Effort normal  He has no wheezes  He has no rales  Lymphadenopathy:     He has no cervical adenopathy  Neurological: He is alert  Skin: No rash noted  No erythema  Warts on feet and finger of rt hand    Psychiatric: He has a normal mood and affect  Nursing note and vitals reviewed  No past surgical history on file  Family History   Problem Relation Age of Onset    No Known Problems Mother          Current Outpatient Medications:     acyclovir (ZOVIRAX) 5 % ointment, Apply topically every 3 (three) hours, Disp: 15 g, Rfl: 2    cetirizine (ZyrTEC) 10 mg tablet, Take 1 tablet (10 mg total) by mouth daily, Disp: 90 tablet, Rfl: 0    cyclobenzaprine (FLEXERIL) 10 mg tablet, Take 1 tablet (10 mg total) by mouth 3 (three) times a day as needed for muscle spasms, Disp: 90 tablet, Rfl: 0    mometasone (NASONEX) 50 mcg/act nasal spray, 2 sprays into each nostril daily, Disp: 17 g, Rfl: 0    Allergies   Allergen Reactions    Penicillins      Reaction Date: [id number];   Unknown- infant allergy     Pollen Extract        Vitals:    09/12/19 0826   BP: 104/70   Pulse: 72   Resp: 16   SpO2: 98%   Weight: 83 5 kg (184 lb)   Height: 5' 11" (1 803 m)

## 2019-09-30 ENCOUNTER — OFFICE VISIT (OUTPATIENT)
Dept: FAMILY MEDICINE CLINIC | Facility: CLINIC | Age: 44
End: 2019-09-30
Payer: COMMERCIAL

## 2019-09-30 VITALS
SYSTOLIC BLOOD PRESSURE: 118 MMHG | HEIGHT: 71 IN | RESPIRATION RATE: 16 BRPM | DIASTOLIC BLOOD PRESSURE: 70 MMHG | OXYGEN SATURATION: 100 % | BODY MASS INDEX: 25.76 KG/M2 | WEIGHT: 184 LBS | HEART RATE: 69 BPM

## 2019-09-30 DIAGNOSIS — B07.0 PLANTAR WART, RIGHT FOOT: ICD-10-CM

## 2019-09-30 DIAGNOSIS — B07.9 VIRAL WART ON FINGER: ICD-10-CM

## 2019-09-30 DIAGNOSIS — B07.0 PLANTAR WART, LEFT FOOT: Primary | ICD-10-CM

## 2019-09-30 PROCEDURE — 17110 DESTRUCTION B9 LES UP TO 14: CPT | Performed by: FAMILY MEDICINE

## 2019-09-30 PROCEDURE — 99213 OFFICE O/P EST LOW 20 MIN: CPT | Performed by: FAMILY MEDICINE

## 2019-09-30 PROCEDURE — 3008F BODY MASS INDEX DOCD: CPT | Performed by: FAMILY MEDICINE

## 2019-09-30 NOTE — PROGRESS NOTES
Assessment/Plan:    Problem List Items Addressed This Visit        Musculoskeletal and Integument    Plantar wart, right foot    Plantar wart, left foot - Primary       Other    Viral wart on finger          Chief Complaint   Patient presents with    Follow-up       Subjective:   Patient ID: Fiorella Centeno is a 44 y o  male  Lesion Destruction  Date/Time: 9/30/2019 2:46 PM  Performed by: Janessa Cortez MD  Authorized by: Janessa Cortez MD     Procedure Details - Lesion Destruction:     Number of Lesions:  3  Lesion 1:     Body area:  Upper extremity    Upper extremity location:  R long finger    Skin lesion 1 size (mm): 1  Malignancy: benign lesion      Destruction method: cryotherapy      Destruction method comment:  Scrpping done with scalapal   Lesion 2:     Body area:  Lower extremity    Lower extremity location:  L second toe    Skin lesion 2 size (mm): 1  Malignancy: benign lesion      Destruction method: cryotherapy      Destruction method comment:   scraping with the scalpal  Lesion 3:     Body area:  Lower extremity    Lower extremity location:  R little toe    Malignancy: benign lesion      Destruction method: cryotherapy      Destruction method comment:  And scraping done with scalpel  Lesion 6:      Patient tolerated well        Review of Systems   Constitutional: Negative  HENT: Negative  Respiratory: Negative  Skin:        Wart present on rt foot little toe , left foot second toe on bottom   Rt hand middle finger         Objective:  Physical Exam   Cardiovascular: Normal rate  Pulmonary/Chest: Effort normal    Skin:   Wart on the right foot little toe,  Want on 2nd toe left foot  And wart on middle finger right hand   Nursing note and vitals reviewed  No past surgical history on file      Family History   Problem Relation Age of Onset    No Known Problems Mother          Current Outpatient Medications:     acyclovir (ZOVIRAX) 5 % ointment, Apply topically every 3 (three) hours, Disp: 15 g, Rfl: 2    cetirizine (ZyrTEC) 10 mg tablet, Take 1 tablet (10 mg total) by mouth daily, Disp: 90 tablet, Rfl: 0    cyclobenzaprine (FLEXERIL) 10 mg tablet, Take 1 tablet (10 mg total) by mouth 3 (three) times a day as needed for muscle spasms, Disp: 90 tablet, Rfl: 0    mometasone (NASONEX) 50 mcg/act nasal spray, 2 sprays into each nostril daily, Disp: 17 g, Rfl: 0    Allergies   Allergen Reactions    Penicillins      Reaction Date: [id number];   Unknown- infant allergy     Pollen Extract        Vitals:    09/30/19 1411   BP: 118/70   Pulse: 69   Resp: 16   SpO2: 100%   Weight: 83 5 kg (184 lb)   Height: 5' 11" (1 803 m)

## 2021-03-10 DIAGNOSIS — Z23 ENCOUNTER FOR IMMUNIZATION: ICD-10-CM

## 2021-03-23 ENCOUNTER — IMMUNIZATIONS (OUTPATIENT)
Dept: FAMILY MEDICINE CLINIC | Facility: HOSPITAL | Age: 46
End: 2021-03-23

## 2021-03-23 DIAGNOSIS — Z23 ENCOUNTER FOR IMMUNIZATION: Primary | ICD-10-CM

## 2021-03-23 PROCEDURE — 0001A SARS-COV-2 / COVID-19 MRNA VACCINE (PFIZER-BIONTECH) 30 MCG: CPT

## 2021-03-23 PROCEDURE — 91300 SARS-COV-2 / COVID-19 MRNA VACCINE (PFIZER-BIONTECH) 30 MCG: CPT

## 2021-04-07 DIAGNOSIS — Z00.00 ENCOUNTER FOR PREVENTIVE HEALTH EXAMINATION: ICD-10-CM

## 2021-04-07 PROBLEM — Z11.3 SCREENING FOR STDS (SEXUALLY TRANSMITTED DISEASES): Status: RESOLVED | Noted: 2018-03-05 | Resolved: 2021-04-07

## 2021-04-14 ENCOUNTER — IMMUNIZATIONS (OUTPATIENT)
Dept: FAMILY MEDICINE CLINIC | Facility: HOSPITAL | Age: 46
End: 2021-04-14

## 2021-04-14 DIAGNOSIS — Z23 ENCOUNTER FOR IMMUNIZATION: Primary | ICD-10-CM

## 2021-04-14 PROCEDURE — 91300 SARS-COV-2 / COVID-19 MRNA VACCINE (PFIZER-BIONTECH) 30 MCG: CPT

## 2021-04-14 PROCEDURE — 0002A SARS-COV-2 / COVID-19 MRNA VACCINE (PFIZER-BIONTECH) 30 MCG: CPT

## 2021-04-22 ENCOUNTER — HOSPITAL ENCOUNTER (OUTPATIENT)
Dept: ULTRASOUND IMAGING | Facility: HOSPITAL | Age: 46
Discharge: HOME | End: 2021-04-22

## 2021-04-22 ENCOUNTER — HOSPITAL ENCOUNTER (OUTPATIENT)
Dept: CT IMAGING | Facility: HOSPITAL | Age: 46
Discharge: HOME | End: 2021-04-22

## 2021-04-22 ENCOUNTER — TRANSCRIBE ORDERS (OUTPATIENT)
Dept: LAB | Facility: CLINIC | Age: 46
End: 2021-04-22

## 2021-04-22 ENCOUNTER — OFFICE VISIT (OUTPATIENT)
Dept: FAMILY MEDICINE CLINIC | Facility: CLINIC | Age: 46
End: 2021-04-22

## 2021-04-22 ENCOUNTER — HOSPITAL ENCOUNTER (OUTPATIENT)
Dept: NON INVASIVE DIAGNOSTICS | Facility: CLINIC | Age: 46
Discharge: HOME | End: 2021-04-22

## 2021-04-22 ENCOUNTER — APPOINTMENT (OUTPATIENT)
Dept: LAB | Facility: CLINIC | Age: 46
End: 2021-04-22

## 2021-04-22 ENCOUNTER — HOSPITAL ENCOUNTER (OUTPATIENT)
Dept: VASCULAR ULTRASOUND | Facility: HOSPITAL | Age: 46
Discharge: HOME | End: 2021-04-22

## 2021-04-22 VITALS
RESPIRATION RATE: 18 BRPM | DIASTOLIC BLOOD PRESSURE: 85 MMHG | HEART RATE: 63 BPM | SYSTOLIC BLOOD PRESSURE: 120 MMHG | TEMPERATURE: 96.9 F | HEIGHT: 71 IN | BODY MASS INDEX: 27.44 KG/M2 | WEIGHT: 196 LBS

## 2021-04-22 DIAGNOSIS — Z12.11 SCREENING FOR COLON CANCER: ICD-10-CM

## 2021-04-22 DIAGNOSIS — Z00.00 ENCOUNTER FOR PREVENTIVE HEALTH EXAMINATION: ICD-10-CM

## 2021-04-22 DIAGNOSIS — R17 ELEVATED BILIRUBIN: ICD-10-CM

## 2021-04-22 DIAGNOSIS — E78.1 HYPERTRIGLYCERIDEMIA: ICD-10-CM

## 2021-04-22 DIAGNOSIS — R79.82 ELEVATED C-REACTIVE PROTEIN (CRP): ICD-10-CM

## 2021-04-22 DIAGNOSIS — Z00.00 WELL ADULT EXAM: Primary | ICD-10-CM

## 2021-04-22 PROBLEM — M77.01 GOLFERS ELBOW, RIGHT: Status: RESOLVED | Noted: 2019-08-20 | Resolved: 2021-04-22

## 2021-04-22 PROBLEM — L30.9 DERMATITIS: Status: RESOLVED | Noted: 2018-03-05 | Resolved: 2021-04-22

## 2021-04-22 PROBLEM — B07.0 PLANTAR WART, LEFT FOOT: Status: RESOLVED | Noted: 2019-09-12 | Resolved: 2021-04-22

## 2021-04-22 PROBLEM — B00.1 RECURRENT COLD SORES: Status: RESOLVED | Noted: 2018-06-27 | Resolved: 2021-04-22

## 2021-04-22 PROBLEM — B07.9 VIRAL WART ON FINGER: Status: RESOLVED | Noted: 2019-08-20 | Resolved: 2021-04-22

## 2021-04-22 PROBLEM — B07.0 PLANTAR WART, RIGHT FOOT: Status: RESOLVED | Noted: 2019-08-20 | Resolved: 2021-04-22

## 2021-04-22 LAB
25(OH)D3 SERPL-MCNC: 41.5 NG/ML (ref 30–100)
ALBUMIN SERPL BCP-MCNC: 4.1 G/DL (ref 3.5–5)
ALP SERPL-CCNC: 63 U/L (ref 46–116)
ALT SERPL W P-5'-P-CCNC: 41 U/L (ref 12–78)
ANION GAP SERPL CALCULATED.3IONS-SCNC: 8 MMOL/L (ref 4–13)
AST SERPL W P-5'-P-CCNC: 19 U/L (ref 5–45)
ATRIAL RATE: 61 BPM
BACTERIA UR QL AUTO: ABNORMAL /HPF
BASOPHILS # BLD AUTO: 0.04 THOUSANDS/ΜL (ref 0–0.1)
BASOPHILS NFR BLD AUTO: 1 % (ref 0–1)
BILIRUB SERPL-MCNC: 1.55 MG/DL (ref 0.2–1)
BILIRUB UR QL STRIP: NEGATIVE
BUN SERPL-MCNC: 19 MG/DL (ref 5–25)
CALCIUM SERPL-MCNC: 9 MG/DL (ref 8.3–10.1)
CHLORIDE SERPL-SCNC: 104 MMOL/L (ref 100–108)
CHOLEST SERPL-MCNC: 203 MG/DL (ref 50–200)
CLARITY UR: CLEAR
CO2 SERPL-SCNC: 29 MMOL/L (ref 21–32)
COLOR UR: YELLOW
CREAT SERPL-MCNC: 0.98 MG/DL (ref 0.6–1.3)
CRP SERPL HS-MCNC: 5.1 MG/L
EOSINOPHIL # BLD AUTO: 0.17 THOUSAND/ΜL (ref 0–0.61)
EOSINOPHIL NFR BLD AUTO: 3 % (ref 0–6)
ERYTHROCYTE [DISTWIDTH] IN BLOOD BY AUTOMATED COUNT: 12.8 % (ref 11.6–15.1)
EST. AVERAGE GLUCOSE BLD GHB EST-MCNC: 105 MG/DL
GFR SERPL CREATININE-BSD FRML MDRD: 93 ML/MIN/1.73SQ M
GLUCOSE P FAST SERPL-MCNC: 105 MG/DL (ref 65–99)
GLUCOSE UR STRIP-MCNC: NEGATIVE MG/DL
HBA1C MFR BLD: 5.3 %
HCT VFR BLD AUTO: 43.1 % (ref 36.5–49.3)
HDLC SERPL-MCNC: 44 MG/DL
HGB BLD-MCNC: 14 G/DL (ref 12–17)
HGB UR QL STRIP.AUTO: NEGATIVE
IMM GRANULOCYTES # BLD AUTO: 0.05 THOUSAND/UL (ref 0–0.2)
IMM GRANULOCYTES NFR BLD AUTO: 1 % (ref 0–2)
KETONES UR STRIP-MCNC: NEGATIVE MG/DL
LDLC SERPL CALC-MCNC: 123 MG/DL (ref 0–100)
LEUKOCYTE ESTERASE UR QL STRIP: NEGATIVE
LYMPHOCYTES # BLD AUTO: 2.01 THOUSANDS/ΜL (ref 0.6–4.47)
LYMPHOCYTES NFR BLD AUTO: 30 % (ref 14–44)
MCH RBC QN AUTO: 27.9 PG (ref 26.8–34.3)
MCHC RBC AUTO-ENTMCNC: 32.5 G/DL (ref 31.4–37.4)
MCV RBC AUTO: 86 FL (ref 82–98)
MONOCYTES # BLD AUTO: 0.54 THOUSAND/ΜL (ref 0.17–1.22)
MONOCYTES NFR BLD AUTO: 8 % (ref 4–12)
NEUTROPHILS # BLD AUTO: 3.84 THOUSANDS/ΜL (ref 1.85–7.62)
NEUTS SEG NFR BLD AUTO: 57 % (ref 43–75)
NITRITE UR QL STRIP: NEGATIVE
NON-SQ EPI CELLS URNS QL MICRO: ABNORMAL /HPF
NRBC BLD AUTO-RTO: 0 /100 WBCS
P AXIS: 67 DEGREES
PH UR STRIP.AUTO: 6.5 [PH]
PLATELET # BLD AUTO: 207 THOUSANDS/UL (ref 149–390)
PMV BLD AUTO: 11.3 FL (ref 8.9–12.7)
POTASSIUM SERPL-SCNC: 3.9 MMOL/L (ref 3.5–5.3)
PR INTERVAL: 132 MS
PROT SERPL-MCNC: 7.7 G/DL (ref 6.4–8.2)
PROT UR STRIP-MCNC: NEGATIVE MG/DL
PSA SERPL-MCNC: 0.9 NG/ML (ref 0–4)
QRS AXIS: 35 DEGREES
QRSD INTERVAL: 86 MS
QT INTERVAL: 412 MS
QTC INTERVAL: 414 MS
RBC # BLD AUTO: 5.01 MILLION/UL (ref 3.88–5.62)
RBC #/AREA URNS AUTO: ABNORMAL /HPF
SODIUM SERPL-SCNC: 141 MMOL/L (ref 136–145)
SP GR UR STRIP.AUTO: 1.02 (ref 1–1.03)
T WAVE AXIS: 5 DEGREES
TRIGL SERPL-MCNC: 179 MG/DL
TSH SERPL DL<=0.05 MIU/L-ACNC: 2.14 UIU/ML (ref 0.36–3.74)
UROBILINOGEN UR QL STRIP.AUTO: 0.2 E.U./DL
VENTRICULAR RATE: 61 BPM
WBC # BLD AUTO: 6.65 THOUSAND/UL (ref 4.31–10.16)
WBC #/AREA URNS AUTO: ABNORMAL /HPF

## 2021-04-22 PROCEDURE — 76700 US EXAM ABDOM COMPLETE: CPT

## 2021-04-22 PROCEDURE — 93351 STRESS TTE COMPLETE: CPT | Performed by: INTERNAL MEDICINE

## 2021-04-22 PROCEDURE — 93306 TTE W/DOPPLER COMPLETE: CPT | Performed by: INTERNAL MEDICINE

## 2021-04-22 PROCEDURE — 36415 COLL VENOUS BLD VENIPUNCTURE: CPT

## 2021-04-22 PROCEDURE — G1004 CDSM NDSC: HCPCS

## 2021-04-22 PROCEDURE — 93010 ELECTROCARDIOGRAM REPORT: CPT | Performed by: INTERNAL MEDICINE

## 2021-04-22 PROCEDURE — 84443 ASSAY THYROID STIM HORMONE: CPT

## 2021-04-22 PROCEDURE — 93350 STRESS TTE ONLY: CPT

## 2021-04-22 PROCEDURE — 86141 C-REACTIVE PROTEIN HS: CPT

## 2021-04-22 PROCEDURE — 84153 ASSAY OF PSA TOTAL: CPT

## 2021-04-22 PROCEDURE — 83036 HEMOGLOBIN GLYCOSYLATED A1C: CPT

## 2021-04-22 PROCEDURE — 80061 LIPID PANEL: CPT

## 2021-04-22 PROCEDURE — 93922 UPR/L XTREMITY ART 2 LEVELS: CPT

## 2021-04-22 PROCEDURE — 80053 COMPREHEN METABOLIC PANEL: CPT

## 2021-04-22 PROCEDURE — 82306 VITAMIN D 25 HYDROXY: CPT

## 2021-04-22 PROCEDURE — VASC: Performed by: SURGERY

## 2021-04-22 PROCEDURE — 81001 URINALYSIS AUTO W/SCOPE: CPT

## 2021-04-22 PROCEDURE — 93005 ELECTROCARDIOGRAM TRACING: CPT

## 2021-04-22 PROCEDURE — 99499EX: Performed by: FAMILY MEDICINE

## 2021-04-22 PROCEDURE — 85025 COMPLETE CBC W/AUTO DIFF WBC: CPT

## 2021-04-22 PROCEDURE — 93306 TTE W/DOPPLER COMPLETE: CPT

## 2021-04-22 PROCEDURE — 75571 CT HRT W/O DYE W/CA TEST: CPT

## 2021-04-22 NOTE — PROGRESS NOTES
HEART AND VASCULAR SUMMARY    1  Lipids: Total 203, , HDL 44,     2  ECG: normal    3  Echocardiogram: Normal except for minimal regurgitation of MV, TV, Aortic valve    4  Stress ECHO: Normal    5  Coronary Calcium CT: No calcium detected    6  The 10-year ASCVD risk score (Lissette Boucher et al , 2013) is: 2%    Values used to calculate the score:      Age: 41 years      Sex: Male      Is Non- : No      Diabetic: No      Tobacco smoker: No      Systolic Blood Pressure: 219 mmHg      Is BP treated: No      HDL Cholesterol: 44 mg/dL      Total Cholesterol: 203 mg/dL     7  HSCRP:   Lab Results   Component Value Date    HSCRP 5 10 04/22/2021       Impression and Recommendations:    1  Poor diet and lack of routine exercise and possibly Covid vaccine may be cause of the elevated HSCRP  2   Otherwise he is low risk from a cardiac standpoint due to his young age but is definitely in need of aggressive dietary modification

## 2021-04-22 NOTE — PROGRESS NOTES
Fitness Assessment  Shakeel scored at the 55% on his body composition assessment with a bodyfat % of 20 6%  Adal Esposito scored in the above average range for flexibility with a sit & reach score of 27 cm  His Muscle Strength/Endurance scores placed him at the 50% (lower body) and 80% (upper body) with a chair stand score of 26 and arm curl score of 28 respectively  Scot Cardiovascular Score of 47 2 placed him at the 85%  Overall Adal Villedar would be considered to have an above average fitness level with a 68% score  Continued emphasis on regular exercise and sound nutrition practices will enable Adal Esposito to reach his optimal level of fitness  [facility] Physical Exam     Leonarda Fuchs is a 41 y o  male who is presenting for an [facility] Physical Exam at [employer]  Overall Mr Tate rates his health as good and he is somewhat content with his overall health status which is improving over the past several years  He is a  for [employer]  He includes running/walking in his fitness routine and his goals are to stay in shape  He does admit that his diet can be higher in carbohydrate at times due to eating often on the run with sporting events for his children  He did have a past medical history of high cholesterol due to poor diet several years ago, but after cutting out fast food and soda, this improved  He also lost almost 40 pounds at the time  He does have some chronic back pain from time to time which seems to be an old injury from age 16 when a pallet fell on him  It flares with certain movements and will keep him down for several days with the need for muscle relaxants  His family history is significant for heart disease and diabetes in both his mother and father who are both   He also has 3 living sisters who also have diabetes  His brother is healthy  Review of Systems   Constitutional: Negative      HENT: Positive for hearing loss (? his girlfriend says possibly)  Eyes: Positive for itching (with season allergies) and visual disturbance (reading close )  Respiratory: Negative  Cardiovascular: Negative  Gastrointestinal: Negative  Endocrine: Negative  Genitourinary: Negative  Musculoskeletal: Positive for back pain  Arthralgias: intermittent but currently no pain  Skin: Negative  Allergic/Immunologic: Negative  Neurological: Negative  Hematological: Negative  Psychiatric/Behavioral: Negative  Active Ambulatory Problems     Diagnosis Date Noted    Allergic rhinitis 07/18/2014    Chronic right-sided low back pain without sciatica 12/29/2016    Need for hepatitis C screening test 08/20/2019    Overweight (BMI 25 0-29 9) 08/20/2019    Hypertriglyceridemia 04/22/2021    Screening for colon cancer 04/22/2021    Well adult exam 04/22/2021    Elevated bilirubin 04/22/2021    Elevated C-reactive protein (CRP) 04/22/2021     Resolved Ambulatory Problems     Diagnosis Date Noted    Chronic lower back pain 12/29/2016    Herpes simplex type 1 infection 04/24/2014    Screening for STDs (sexually transmitted diseases) 03/05/2018    Dermatitis 03/05/2018    Acute bronchitis 06/19/2018    Acute non-recurrent maxillary sinusitis 06/19/2018    Recurrent cold sores 06/27/2018    Plantar wart, right foot 08/20/2019    Golfers elbow, right 08/20/2019    Viral wart on finger 08/20/2019    Plantar wart, left foot 09/12/2019     Past Medical History:   Diagnosis Date    Dentoalveolar abscess     Diabetes ([address] )     Hordeolum externum unspecified eye, unspecified eyelid     IBS (irritable bowel syndrome)     Impaired fasting glucose     Rosacea        History reviewed  No pertinent surgical history      Family History   Problem Relation Age of Onset    Heart disease Mother     Diabetes Mother     Hypertension Mother     Hyperlipidemia Mother     Kidney failure Mother     Heart disease Father    Llanes Diabetes Father     Hyperlipidemia Father     Diabetes Sister     Diabetes Sister     Diabetes Sister        Social History     Tobacco Use   Smoking Status Never Smoker   Smokeless Tobacco Never Used         Current Outpatient Medications:     Multiple Vitamin (MULTIVITAMIN ADULT PO), Take by mouth, Disp: , Rfl:     Allergies   Allergen Reactions    Penicillins      Reaction Date: [id number]; Unknown- infant allergy     Pollen Extract          Objective:    Vitals:    04/22/21 1101   BP: 120/85   Pulse: 63   Resp: 18   Temp: (!) 96 9 °F (36 1 °C)   Weight: 88 9 kg (196 lb)   Height: 5' 11" (1 803 m)        Physical Exam  Vitals signs reviewed  Constitutional:       Appearance: Normal appearance  HENT:      Head: Normocephalic and atraumatic  Right Ear: Tympanic membrane, ear canal and external ear normal       Left Ear: Tympanic membrane, ear canal and external ear normal       Nose: Nose normal       Mouth/Throat:      Mouth: Mucous membranes are moist       Pharynx: Oropharynx is clear  Eyes:      Extraocular Movements: Extraocular movements intact  Conjunctiva/sclera: Conjunctivae normal       Pupils: Pupils are equal, round, and reactive to light  Neck:      Musculoskeletal: Normal range of motion and neck supple  Cardiovascular:      Rate and Rhythm: Normal rate  Pulses: Normal pulses  Heart sounds: Normal heart sounds  Pulmonary:      Effort: Pulmonary effort is normal       Breath sounds: Normal breath sounds  Abdominal:      General: Abdomen is flat  Bowel sounds are normal       Palpations: Abdomen is soft  Musculoskeletal: Normal range of motion  Skin:     General: Skin is warm and dry  Capillary Refill: Capillary refill takes less than 2 seconds  Neurological:      General: No focal deficit present  Mental Status: He is alert and oriented to person, place, and time     Psychiatric:         Mood and Affect: Mood normal          Behavior: Behavior normal          Thought Content: Thought content normal          Judgment: Judgment normal              Assessment/Plan:     1  Well adult exam    2  Hypertriglyceridemia  -     Lipid Panel with Direct LDL reflex; Future; Expected date: 08/02/2021    3  Screening for colon cancer    4  Elevated bilirubin  -     Comprehensive metabolic panel; Future; Expected date: 08/02/2021    5  Elevated C-reactive protein (CRP)         [facility] Summary: It was a pleasure to host Mr Tate at his first [id number] [place] physical [facility] to [facility] overall [facility]  Vascular screening was completed and revealed no significant vascular lesion  His CT coronary calcium score was zero which is normal  He has an elevated CRP level which may be related to diet and possible recent covid 19 vaccine last week  His triglycerides are elevated which may be reflected of his diet  With some dietary changes, this can be improved  A repeat level is ordered for 4-5 months  His bilirubin was elevated but he has no history of liver disease and this was previous normal  This will be repeated in next set of lab work  He does get an annual influenza vaccine at work and this is up to date  He also did complete his covid 19 series this past week  He is overdue to a TDAP vaccine which I have recommended his to do at some point in the future  I have also recommended a screening colonoscopy at age 40 which is later this year for him  He is unsure if his parents had colon poylps  He does take a vitamin multi pack from [facility] which seems appropriate for him  It was truly a pleasure to host Mr Tate today  He has my personal contact number as well as my e-mail to reach out to me with any questions regarding today's visit or in regard to his overall health

## 2021-04-22 NOTE — PROGRESS NOTES
Hearing Assessment Summary:     Hearing Screening    125Hz 250Hz 500Hz 1000Hz 2000Hz 3000Hz 4000Hz 6000Hz 8000Hz   Right ear:  20 15 20 10 15 25 20 5   Left ear:  20 15 10 10 20 35 15 5   Comments: HEARING EVALUATION    Name:  Thomas Hung  :  1975  Age:  41 y o  Date of Evaluation: 21     History: [facility] Exam  Reason for visit: Thomas Hung is being seen today for an evaluation of hearing as part of his [facility] physical   Patient reports that his family notices difficulty with his understanding ability  He denies ear pain, tinnitus or dizziness  He reports history of shooting at the gun range (not always with ear protection)  EVALUATION:    Otoscopic Evaluation:   Right Ear: Clear and healthy ear canal and tympanic membrane   Left Ear: Clear and healthy ear canal and tympanic membrane    Tympanometry:   Right: Type A - normal middle ear pressure and compliance   Left: Type A - normal middle ear pressure and compliance    Audiogram Results:  Normal peripheral hearing sensitivity in each ear except for mild sensorineural hearing loss at [facility] in the left ear  *see attached audiogram      RECOMMENDATIONS:  Annual hearing eval, Return to [facility]  for F/U, Hearing Protection and Copy to Patient/Caregiver    PATIENT EDUCATION:   Discussed results and recommendations with patient  Questions were addressed and the patient was encouraged to contact our department should concerns arise        Kelsie Nicholas   Clinical Audiologist       Visual Acuity Screening    Right eye Left eye Both eyes   Without correction: 20/15 20/10 20/13   With correction:

## 2021-04-22 NOTE — PROGRESS NOTES
Nutritional Summary and Recommendations:     Jonana Kerr presents for nutrition assessment and counseling  Joanna Kerr reports difficulty with choosing healthy snacks when on the go and portion sizes at meals, specifically carbohydrate foods  Discussion focused on healthy snack options when on the go, heart-healthy diet recommendations, and importance of portion sizes  Ht: 5'11",  Wt: 194 2 lb,  BMI: 27 1,  Tanita BMR: 1886 kcal,  Fat Mass: 40 0 lb,  FFM: 154 2 lb,  Desirable Fat Mass: 43 4 lb  Labs: 4/22/21 Cholesterol 203, Triglycerides 179, HDL 44, , Vitamin D 41 5, A1C 5 3  Meds: Reviewed  Nutrition Diagnosis: Altered nutrition-related laboratory values related to physiological causes as evidenced by abnormal triglycerides, LDL  Goals:  1  Reduce saturated fat intake to help improve LDL cholesterol  2  Limit intake of refined, low-fiber carbohydrates  Aim to include whole grain/fiber-rich carbohydrate choices at meals as discussed with RD   3  Include lean protein sources at all meals/snacks  4  Aim to increase intake of fruits and vegetables throughout the day  5  Be mindful of portion sizes at meals, especially of carbohydrates  6  Contact RD with any questions/concerns      Perceived Comprehension: Very Good  Expected Compliance: Very Good

## 2021-04-23 ENCOUNTER — TELEPHONE (OUTPATIENT)
Dept: FAMILY MEDICINE CLINIC | Facility: CLINIC | Age: 46
End: 2021-04-23

## 2021-04-23 NOTE — TELEPHONE ENCOUNTER
----- Message from Jeyson Izaguirre MD sent at 4/22/2021  3:57 PM EDT -----  Regarding: FW: [facility] Physical 04/22/21  I reviewed labs, he has high cholesterol and borderline blood sugar elevation he should follow-up   ----- Message -----  From: Delroy Pierce  Sent: 4/22/2021   3:27 PM EDT  To: Jeyson Izaguirre MD  Subject: [facility] Physical 04/22/21                    Dr Steffen Cid,    Per patient's request, please review results from [address] [facility] physical today, 04/22/21      Thank you,    [address]  Phone: [phone]

## 2021-04-29 ENCOUNTER — OFFICE VISIT (OUTPATIENT)
Dept: FAMILY MEDICINE CLINIC | Facility: CLINIC | Age: 46
End: 2021-04-29
Payer: COMMERCIAL

## 2021-04-29 VITALS
DIASTOLIC BLOOD PRESSURE: 78 MMHG | SYSTOLIC BLOOD PRESSURE: 120 MMHG | HEIGHT: 71 IN | RESPIRATION RATE: 16 BRPM | OXYGEN SATURATION: 98 % | BODY MASS INDEX: 27.44 KG/M2 | WEIGHT: 196 LBS | HEART RATE: 75 BPM

## 2021-04-29 DIAGNOSIS — Z23 NEED FOR DIPHTHERIA-TETANUS-PERTUSSIS (TDAP) VACCINE: ICD-10-CM

## 2021-04-29 DIAGNOSIS — J30.1 SEASONAL ALLERGIC RHINITIS DUE TO POLLEN: Primary | ICD-10-CM

## 2021-04-29 PROCEDURE — 90715 TDAP VACCINE 7 YRS/> IM: CPT | Performed by: FAMILY MEDICINE

## 2021-04-29 PROCEDURE — 90471 IMMUNIZATION ADMIN: CPT | Performed by: FAMILY MEDICINE

## 2021-04-29 PROCEDURE — 3725F SCREEN DEPRESSION PERFORMED: CPT | Performed by: FAMILY MEDICINE

## 2021-04-29 PROCEDURE — 3008F BODY MASS INDEX DOCD: CPT | Performed by: FAMILY MEDICINE

## 2021-04-29 PROCEDURE — 1036F TOBACCO NON-USER: CPT | Performed by: FAMILY MEDICINE

## 2021-04-29 PROCEDURE — 99213 OFFICE O/P EST LOW 20 MIN: CPT | Performed by: FAMILY MEDICINE

## 2021-04-29 RX ORDER — MOMETASONE FUROATE 50 UG/1
2 SPRAY, METERED NASAL DAILY
COMMUNITY
End: 2021-04-29 | Stop reason: SDUPTHER

## 2021-04-29 RX ORDER — MOMETASONE FUROATE 50 UG/1
2 SPRAY, METERED NASAL DAILY
Qty: 1 ACT | Refills: 5 | Status: SHIPPED | OUTPATIENT
Start: 2021-04-29 | End: 2022-05-04 | Stop reason: SDUPTHER

## 2021-04-29 RX ORDER — OLOPATADINE HYDROCHLORIDE 1 MG/ML
1 SOLUTION/ DROPS OPHTHALMIC 2 TIMES DAILY
Qty: 5 ML | Refills: 2 | Status: SHIPPED | OUTPATIENT
Start: 2021-04-29 | End: 2022-05-04 | Stop reason: SDUPTHER

## 2021-04-29 RX ORDER — CETIRIZINE HYDROCHLORIDE 10 MG/1
10 TABLET ORAL DAILY
Qty: 90 TABLET | Refills: 1 | Status: SHIPPED | OUTPATIENT
Start: 2021-04-29 | End: 2022-05-10 | Stop reason: SDUPTHER

## 2021-04-29 NOTE — ASSESSMENT & PLAN NOTE
Will try Nasonex, the attack and eyedrops to control his allergies and talked about prevention as much as  possible

## 2021-04-29 NOTE — PROGRESS NOTES
Assessment/Plan:    Problem List Items Addressed This Visit        Respiratory    Allergic rhinitis - Primary     Will try Nasonex, the attack and eyedrops to control his allergies and talked about prevention as much as  possible         Relevant Medications    mometasone (NASONEX) 50 mcg/act nasal spray    cetirizine (ZyrTEC) 10 mg tablet    olopatadine (PATANOL) 0 1 % ophthalmic solution       Other    Need for diphtheria-tetanus-pertussis (Tdap) vaccine    Relevant Orders    TDAP VACCINE GREATER THAN OR EQUAL TO 8YO IM (Completed)      Discussed about the tetanus vaccine and he agrees    Chief Complaint   Patient presents with    Follow-up       Subjective:   Patient ID: Hyun Llanos is a 41 y o  male  He 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 [facility] and his blood work was done and reordered for the future,  He is here for his allergy problem he gets his seasonal allergies and complains of itchy eyes watery eyes and sticking in the morning  He has stuffy nose scratchy throat in the past he has used some eyedrops and Nasonex he says xyzal is not working      Review of Systems   Constitutional: Negative for activity change, appetite change, chills, fatigue, fever and unexpected weight change  HENT: Positive for congestion and sneezing  Negative for ear discharge, ear pain, nosebleeds, postnasal drip, rhinorrhea, sinus pressure, sore throat, trouble swallowing and voice change  Eyes: Positive for itching  Negative for photophobia, pain, discharge, redness and visual disturbance  Respiratory: Negative for cough, chest tightness, shortness of breath and wheezing  Cardiovascular: Negative for chest pain, palpitations and leg swelling  Gastrointestinal: Negative for abdominal pain, constipation, diarrhea, nausea and vomiting  Endocrine: Negative for polyuria  Genitourinary: Negative for dysuria, frequency and urgency     Musculoskeletal: Negative for arthralgias, back pain, myalgias and neck pain    Skin: Negative for color change, pallor and rash  Allergic/Immunologic: Negative for environmental allergies and food allergies  Neurological: Negative for dizziness, weakness, light-headedness and headaches  Hematological: Negative for adenopathy  Does not bruise/bleed easily  Psychiatric/Behavioral: Negative for behavioral problems  The patient is not nervous/anxious  Objective:  Physical Exam  Vitals signs and nursing note reviewed  Constitutional:       Appearance: Normal appearance  HENT:      Head: Normocephalic  Neck:      Musculoskeletal: Normal range of motion  Cardiovascular:      Rate and Rhythm: Normal rate  Heart sounds: No murmur  Pulmonary:      Effort: Pulmonary effort is normal  No respiratory distress  Musculoskeletal: Normal range of motion  Skin:     General: Skin is dry  Neurological:      General: No focal deficit present  History reviewed  No pertinent surgical history  Family History   Problem Relation Age of Onset    Heart disease Mother     Diabetes Mother     Hypertension Mother     Hyperlipidemia Mother     Kidney failure Mother     Heart disease Father     Diabetes Father     Hyperlipidemia Father     Diabetes Sister     Diabetes Sister     Diabetes Sister          Current Outpatient Medications:     mometasone (NASONEX) 50 mcg/act nasal spray, 2 sprays into each nostril daily, Disp: 1 Act, Rfl: 5    Multiple Vitamin (MULTIVITAMIN ADULT PO), Take by mouth, Disp: , Rfl:     cetirizine (ZyrTEC) 10 mg tablet, Take 1 tablet (10 mg total) by mouth daily, Disp: 90 tablet, Rfl: 1    olopatadine (PATANOL) 0 1 % ophthalmic solution, Administer 1 drop to both eyes 2 (two) times a day, Disp: 5 mL, Rfl: 2    Allergies   Allergen Reactions    Penicillins      Reaction Date: [id number];   Unknown- infant allergy     Pollen Extract        Vitals:    04/29/21 0849   BP: 120/78   Pulse: 75   Resp: 16   SpO2: 98%   Weight: 88 9 kg (196 lb)   Height: 5' 11" (1 803 m)

## 2021-12-07 ENCOUNTER — IMMUNIZATIONS (OUTPATIENT)
Dept: FAMILY MEDICINE CLINIC | Facility: HOSPITAL | Age: 46
End: 2021-12-07

## 2021-12-07 DIAGNOSIS — Z23 ENCOUNTER FOR IMMUNIZATION: Primary | ICD-10-CM

## 2021-12-07 PROCEDURE — 91300 COVID-19 PFIZER VACC 0.3 ML: CPT

## 2021-12-07 PROCEDURE — 0001A COVID-19 PFIZER VACC 0.3 ML: CPT

## 2022-03-17 PROCEDURE — U0003 INFECTIOUS AGENT DETECTION BY NUCLEIC ACID (DNA OR RNA); SEVERE ACUTE RESPIRATORY SYNDROME CORONAVIRUS 2 (SARS-COV-2) (CORONAVIRUS DISEASE [COVID-19]), AMPLIFIED PROBE TECHNIQUE, MAKING USE OF HIGH THROUGHPUT TECHNOLOGIES AS DESCRIBED BY CMS-2020-01-R: HCPCS | Performed by: FAMILY MEDICINE

## 2022-03-17 PROCEDURE — U0005 INFEC AGEN DETEC AMPLI PROBE: HCPCS | Performed by: FAMILY MEDICINE

## 2022-05-04 ENCOUNTER — OFFICE VISIT (OUTPATIENT)
Dept: FAMILY MEDICINE CLINIC | Facility: CLINIC | Age: 47
End: 2022-05-04
Payer: COMMERCIAL

## 2022-05-04 VITALS
SYSTOLIC BLOOD PRESSURE: 118 MMHG | HEART RATE: 64 BPM | DIASTOLIC BLOOD PRESSURE: 78 MMHG | BODY MASS INDEX: 28.42 KG/M2 | HEIGHT: 71 IN | WEIGHT: 203 LBS | OXYGEN SATURATION: 99 % | RESPIRATION RATE: 16 BRPM

## 2022-05-04 DIAGNOSIS — J30.1 SEASONAL ALLERGIC RHINITIS DUE TO POLLEN: ICD-10-CM

## 2022-05-04 DIAGNOSIS — Z13.6 SCREENING FOR CARDIOVASCULAR CONDITION: ICD-10-CM

## 2022-05-04 DIAGNOSIS — D22.9 CHANGE IN SKIN MOLE: ICD-10-CM

## 2022-05-04 DIAGNOSIS — Z12.11 SCREEN FOR COLON CANCER: ICD-10-CM

## 2022-05-04 DIAGNOSIS — Z13.1 SCREENING FOR DIABETES MELLITUS: ICD-10-CM

## 2022-05-04 DIAGNOSIS — E78.1 HYPERTRIGLYCERIDEMIA: ICD-10-CM

## 2022-05-04 DIAGNOSIS — Z00.00 ANNUAL PHYSICAL EXAM: Primary | ICD-10-CM

## 2022-05-04 PROCEDURE — 99396 PREV VISIT EST AGE 40-64: CPT | Performed by: FAMILY MEDICINE

## 2022-05-04 PROCEDURE — 3725F SCREEN DEPRESSION PERFORMED: CPT | Performed by: FAMILY MEDICINE

## 2022-05-04 RX ORDER — OLOPATADINE HYDROCHLORIDE 1 MG/ML
1 SOLUTION/ DROPS OPHTHALMIC 2 TIMES DAILY
Qty: 5 ML | Refills: 2 | Status: SHIPPED | OUTPATIENT
Start: 2022-05-04

## 2022-05-04 RX ORDER — MOMETASONE FUROATE 50 UG/1
2 SPRAY, METERED NASAL DAILY
Qty: 1 ACT | Refills: 5 | Status: SHIPPED | OUTPATIENT
Start: 2022-05-04

## 2022-05-04 NOTE — PATIENT INSTRUCTIONS

## 2022-05-04 NOTE — PROGRESS NOTES
[facility]    NAME: Jazmine Philip  AGE: 40 y o  SEX: male  : 1975     DATE: 2022     Assessment and Plan:     Problem List Items Addressed This Visit        Respiratory    Allergic rhinitis    Relevant Medications    mometasone (NASONEX) 50 mcg/act nasal spray    olopatadine (PATANOL) 0 1 % ophthalmic solution       Musculoskeletal and Integument    Change in skin mole     5 x 5mm black mole on  left side hair line ,he says it increased in size ,ref to dermatologist          Relevant Orders    Ambulatory Referral to Dermatology       Other    Hypertriglyceridemia    Relevant Orders    Lipid panel    Screening for cardiovascular condition - Primary    Relevant Orders    CBC and differential    Lipid panel    TSH, 3rd generation      The 10-year ASCVD risk score (Khris Lund et al , 2013) is: 2 1%    Values used to calculate the score:      Age: 40 years      Sex: Male      Is Non- : No      Diabetic: No      Tobacco smoker: No      Systolic Blood Pressure: 960 mmHg      Is BP treated: No      HDL Cholesterol: 44 mg/dL      Total Cholesterol: 203 mg/dL      Immunizations and preventive care screenings were discussed with patient today  Appropriate education was printed on patient's after visit summary  Counseling:  Dental Health: discussed importance of regular tooth brushing, flossing, and dental visits  Sexual health: discussed sexually transmitted diseases, partner selection, use of condoms, avoidance of unintended pregnancy, and contraceptive alternatives  Exercise: the importance of regular exercise/physical activity was discussed  Recommend exercise 3-5 times per week for at least 30 minutes  BMI Counseling: Body mass index is 28 31 kg/m²   The BMI is above normal  Nutrition recommendations include decreasing portion sizes, decreasing fast food intake, moderation in carbohydrate intake and reducing intake of cholesterol  Exercise recommendations include exercising 3-5 times per week  Rationale for BMI follow-up plan is due to patient being overweight or obese  Depression Screening and Follow-up Plan: Patient was screened for depression during today's encounter  They screened negative with a PHQ-2 score of 0  No follow-ups on file  Chief Complaint:     Chief Complaint   Patient presents with    Annual Exam      History of Present Illness:     Adult Annual Physical   Patient here for a comprehensive physical exam  The patient reports problems - seasonal allergies   Diet and Physical Activity  Diet/Nutrition: well balanced diet  Exercise: moderate cardiovascular exercise  Depression Screening  PHQ-2/9 Depression Screening    Little interest or pleasure in doing things: 0 - not at all  Feeling down, depressed, or hopeless: 0 - not at all  PHQ-2 Score: 0  PHQ-2 Interpretation: Negative depression screen       General Health  Sleep: sleeps well  Hearing: normal - bilateral   Vision: vision problems: using reading glasses  Dental: regular dental visits   Health  Symptoms include: none     Review of Systems:     Review of Systems   Constitutional: Negative for activity change, appetite change, chills, fatigue, fever and unexpected weight change  HENT: Negative for congestion, ear discharge, ear pain, nosebleeds, postnasal drip, rhinorrhea, sinus pressure, sneezing, sore throat, trouble swallowing and voice change  Eyes: Negative for photophobia, pain, discharge, redness and itching  Respiratory: Negative for cough, chest tightness, shortness of breath and wheezing  Cardiovascular: Negative for chest pain, palpitations and leg swelling  Gastrointestinal: Negative for abdominal pain, constipation, diarrhea, nausea and vomiting  Endocrine: Negative for polyuria  Genitourinary: Negative for dysuria, frequency and urgency     Musculoskeletal: Negative for arthralgias, back pain, myalgias and neck pain  Skin: Negative for color change, pallor and rash  Allergic/Immunologic: Negative for environmental allergies and food allergies  Neurological: Negative for dizziness, weakness, light-headedness and headaches  Hematological: Negative for adenopathy  Does not bruise/bleed easily  Psychiatric/Behavioral: Negative for behavioral problems  The patient is not nervous/anxious  Past Medical History:     Past Medical History:   Diagnosis Date    Allergic rhinitis     Onset: 4/28/2008    Chronic lower back pain 12/29/2016    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/2012    Dermatitis 3/5/2018    Diabetes ([address] )     Onset: 4/14/2009    Golfers elbow, right 8/20/2019    Hordeolum externum unspecified eye, unspecified eyelid     Onset: 6/30/2009    IBS (irritable bowel syndrome)     Onset: 5/23/2008    Impaired fasting glucose     Onset: 4/28/2009    Plantar wart, left foot     Last Assessed: 12/29/2016    Plantar wart, right foot 8/20/2019    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    Rosacea     Onset: 3/21/2006    Viral wart on finger 8/20/2019      Past Surgical History:     History reviewed  No pertinent surgical history     Family History:     Family History   Problem Relation Age of Onset    Heart disease Mother     Diabetes Mother     Hypertension Mother     Hyperlipidemia Mother     Kidney failure Mother     Heart disease Father     Diabetes Father     Hyperlipidemia Father     Diabetes Sister     Diabetes Sister     Diabetes Sister       Social History:     Social History     Socioeconomic History    Marital status:      Spouse name: None    Number of children: 2    Years of education: None    Highest education level: None   Occupational History    None   Tobacco Use    Smoking status: Never Smoker    Smokeless tobacco: Never Used   Substance and Sexual Activity    Alcohol use: Never    Drug use: Never    Sexual activity: None   Other Topics Concern    None   Social History Narrative    Caffeine use     Social Determinants of Health     Financial Resource Strain: Not on file   Food Insecurity: Not on file   Transportation Needs: Not on file   Physical Activity: Not on file   Stress: Not on file   Social Connections: Not on file   Intimate Partner Violence: Not on file   Housing Stability: Not on file      Current Medications:     Current Outpatient Medications   Medication Sig Dispense Refill    cetirizine (ZyrTEC) 10 mg tablet Take 1 tablet (10 mg total) by mouth daily 90 tablet 1    mometasone (NASONEX) 50 mcg/act nasal spray 2 sprays into each nostril daily 1 Act 5    Multiple Vitamin (MULTIVITAMIN ADULT PO) Take by mouth      olopatadine (PATANOL) 0 1 % ophthalmic solution Administer 1 drop to both eyes 2 (two) times a day 5 mL 2     No current facility-administered medications for this visit  Allergies: Allergies   Allergen Reactions    Penicillin G Abdominal Pain, Allergic Rhinitis, Anaphylaxis, Angioedema, Anxiety, Arthralgia, Blisters, Bradycardia, Chest Pain, Confusion, Cough, Delirium, Dermatitis, Diarrhea, Dizziness, Drowsiness, Edema, Eye Swelling, Facial Swelling, Fatigue, Fever, GI Bleeding, GI Intolerance, Hallucinations, Headache, Hemorrhagic stroke, Hives, Hyperactivity, Hypertension, Irritability, Itching, Lightheadedness, Lip Swelling, Myalgia, Nasal Congestion, Nausea Only, Other (See Comments), Palpitations, Photosensitivity, Rash, Seizures, Shortness Of Breath, Sneezing, Swelling, Syncope, Tachycardia, Throat Swelling, Tinnitus, Tongue Swelling, Tremor, Visual Disturbance, Vomiting and Wheezing    Penicillins      Reaction Date: [id number]; Unknown- infant allergy     Pollen Extract       Physical Exam:     /78   Pulse 64   Resp 16   Ht 5' 11" (1 803 m)   Wt 92 1 kg (203 lb)   SpO2 99%   BMI 28 31 kg/m²     Physical Exam  Vitals and nursing note reviewed  Constitutional:       General: He is not in acute distress  Appearance: Normal appearance  HENT:      Head: Normocephalic and atraumatic  Right Ear: Tympanic membrane and ear canal normal  There is no impacted cerumen  Left Ear: Tympanic membrane and ear canal normal  There is no impacted cerumen  Nose: Nose normal  No rhinorrhea  Mouth/Throat:      Mouth: Mucous membranes are moist       Pharynx: Oropharynx is clear  No oropharyngeal exudate or posterior oropharyngeal erythema  Eyes:      Extraocular Movements: Extraocular movements intact  Conjunctiva/sclera: Conjunctivae normal       Pupils: Pupils are equal, round, and reactive to light  Cardiovascular:      Rate and Rhythm: Normal rate and regular rhythm  Heart sounds: No murmur heard  Pulmonary:      Effort: Pulmonary effort is normal       Breath sounds: Normal breath sounds  No wheezing or rales  Abdominal:      General: Abdomen is flat  Bowel sounds are normal  There is no distension  Palpations: Abdomen is soft  There is no mass  Tenderness: There is no abdominal tenderness  There is no guarding  Musculoskeletal:         General: No swelling, tenderness, deformity or signs of injury  Normal range of motion  Cervical back: Normal range of motion and neck supple  Skin:     Coloration: Skin is not jaundiced or pale  Findings: No rash  Comments: Irregular black mole on left side forehead/scalp about 5 mm x 5 mm   Neurological:      General: No focal deficit present  Mental Status: He is alert and oriented to person, place, and time  Motor: No weakness  Psychiatric:         Mood and Affect: Mood normal          Behavior: Behavior normal          Thought Content:  Thought content normal          Judgment: Judgment normal           MD [place]c [place] [id number]

## 2022-05-05 ENCOUNTER — LAB (OUTPATIENT)
Dept: LAB | Facility: CLINIC | Age: 47
End: 2022-05-05
Payer: COMMERCIAL

## 2022-05-05 DIAGNOSIS — E78.1 HYPERTRIGLYCERIDEMIA: ICD-10-CM

## 2022-05-05 DIAGNOSIS — Z13.6 SCREENING FOR CARDIOVASCULAR CONDITION: ICD-10-CM

## 2022-05-05 DIAGNOSIS — Z13.1 SCREENING FOR DIABETES MELLITUS: ICD-10-CM

## 2022-05-05 LAB
ALBUMIN SERPL BCP-MCNC: 3.8 G/DL (ref 3.5–5)
ALP SERPL-CCNC: 60 U/L (ref 46–116)
ALT SERPL W P-5'-P-CCNC: 32 U/L (ref 12–78)
ANION GAP SERPL CALCULATED.3IONS-SCNC: 3 MMOL/L (ref 4–13)
AST SERPL W P-5'-P-CCNC: 17 U/L (ref 5–45)
BASOPHILS # BLD AUTO: 0.03 THOUSANDS/ΜL (ref 0–0.1)
BASOPHILS NFR BLD AUTO: 1 % (ref 0–1)
BILIRUB SERPL-MCNC: 0.68 MG/DL (ref 0.2–1)
BUN SERPL-MCNC: 15 MG/DL (ref 5–25)
CALCIUM SERPL-MCNC: 9.6 MG/DL (ref 8.3–10.1)
CHLORIDE SERPL-SCNC: 107 MMOL/L (ref 100–108)
CHOLEST SERPL-MCNC: 206 MG/DL
CO2 SERPL-SCNC: 29 MMOL/L (ref 21–32)
CREAT SERPL-MCNC: 1.1 MG/DL (ref 0.6–1.3)
EOSINOPHIL # BLD AUTO: 0.13 THOUSAND/ΜL (ref 0–0.61)
EOSINOPHIL NFR BLD AUTO: 2 % (ref 0–6)
ERYTHROCYTE [DISTWIDTH] IN BLOOD BY AUTOMATED COUNT: 13.1 % (ref 11.6–15.1)
EST. AVERAGE GLUCOSE BLD GHB EST-MCNC: 105 MG/DL
GFR SERPL CREATININE-BSD FRML MDRD: 80 ML/MIN/1.73SQ M
GLUCOSE P FAST SERPL-MCNC: 103 MG/DL (ref 65–99)
HBA1C MFR BLD: 5.3 %
HCT VFR BLD AUTO: 43.8 % (ref 36.5–49.3)
HDLC SERPL-MCNC: 36 MG/DL
HGB BLD-MCNC: 14.2 G/DL (ref 12–17)
IMM GRANULOCYTES # BLD AUTO: 0.05 THOUSAND/UL (ref 0–0.2)
IMM GRANULOCYTES NFR BLD AUTO: 1 % (ref 0–2)
LDLC SERPL CALC-MCNC: 108 MG/DL (ref 0–100)
LYMPHOCYTES # BLD AUTO: 1.49 THOUSANDS/ΜL (ref 0.6–4.47)
LYMPHOCYTES NFR BLD AUTO: 25 % (ref 14–44)
MCH RBC QN AUTO: 27.7 PG (ref 26.8–34.3)
MCHC RBC AUTO-ENTMCNC: 32.4 G/DL (ref 31.4–37.4)
MCV RBC AUTO: 86 FL (ref 82–98)
MONOCYTES # BLD AUTO: 0.49 THOUSAND/ΜL (ref 0.17–1.22)
MONOCYTES NFR BLD AUTO: 8 % (ref 4–12)
NEUTROPHILS # BLD AUTO: 3.7 THOUSANDS/ΜL (ref 1.85–7.62)
NEUTS SEG NFR BLD AUTO: 63 % (ref 43–75)
NONHDLC SERPL-MCNC: 170 MG/DL
NRBC BLD AUTO-RTO: 0 /100 WBCS
PLATELET # BLD AUTO: 165 THOUSANDS/UL (ref 149–390)
PMV BLD AUTO: 12.6 FL (ref 8.9–12.7)
POTASSIUM SERPL-SCNC: 4.4 MMOL/L (ref 3.5–5.3)
PROT SERPL-MCNC: 7.5 G/DL (ref 6.4–8.2)
RBC # BLD AUTO: 5.12 MILLION/UL (ref 3.88–5.62)
SODIUM SERPL-SCNC: 139 MMOL/L (ref 136–145)
TRIGL SERPL-MCNC: 309 MG/DL
TSH SERPL DL<=0.05 MIU/L-ACNC: 1.6 UIU/ML (ref 0.45–4.5)
WBC # BLD AUTO: 5.89 THOUSAND/UL (ref 4.31–10.16)

## 2022-05-05 PROCEDURE — 84443 ASSAY THYROID STIM HORMONE: CPT

## 2022-05-05 PROCEDURE — 36415 COLL VENOUS BLD VENIPUNCTURE: CPT

## 2022-05-05 PROCEDURE — 80061 LIPID PANEL: CPT

## 2022-05-05 PROCEDURE — 85025 COMPLETE CBC W/AUTO DIFF WBC: CPT

## 2022-05-05 PROCEDURE — 80053 COMPREHEN METABOLIC PANEL: CPT

## 2022-05-05 PROCEDURE — 83036 HEMOGLOBIN GLYCOSYLATED A1C: CPT

## 2022-05-10 DIAGNOSIS — J30.1 SEASONAL ALLERGIC RHINITIS DUE TO POLLEN: ICD-10-CM

## 2022-05-10 RX ORDER — CETIRIZINE HYDROCHLORIDE 10 MG/1
10 TABLET ORAL DAILY
Qty: 90 TABLET | Refills: 1 | Status: SHIPPED | OUTPATIENT
Start: 2022-05-10

## 2022-05-12 ENCOUNTER — OFFICE VISIT (OUTPATIENT)
Dept: FAMILY MEDICINE CLINIC | Facility: CLINIC | Age: 47
End: 2022-05-12
Payer: COMMERCIAL

## 2022-05-12 VITALS
HEIGHT: 71 IN | RESPIRATION RATE: 16 BRPM | WEIGHT: 203 LBS | DIASTOLIC BLOOD PRESSURE: 80 MMHG | SYSTOLIC BLOOD PRESSURE: 122 MMHG | HEART RATE: 92 BPM | OXYGEN SATURATION: 98 % | BODY MASS INDEX: 28.42 KG/M2

## 2022-05-12 DIAGNOSIS — E78.1 HYPERTRIGLYCERIDEMIA: Primary | ICD-10-CM

## 2022-05-12 DIAGNOSIS — J30.1 SEASONAL ALLERGIC RHINITIS DUE TO POLLEN: ICD-10-CM

## 2022-05-12 DIAGNOSIS — R73.01 IMPAIRED FASTING BLOOD SUGAR: ICD-10-CM

## 2022-05-12 PROCEDURE — 99213 OFFICE O/P EST LOW 20 MIN: CPT | Performed by: FAMILY MEDICINE

## 2022-05-12 NOTE — PROGRESS NOTES
Assessment/Plan:    Problem List Items Addressed This Visit        Endocrine    Impaired fasting blood sugar     Discussed about cutting down carbs and sugars in his diet and do regular exercise, will repeat his labs with A1c in 6 month           Relevant Orders    Comprehensive metabolic panel    Lipid panel    Hemoglobin A1C       Respiratory    Allergic rhinitis     Stable with medications              Other    Hypertriglyceridemia - Primary     Strictly states significantly elevated, discussed with him to improve his diet exercise and will repeat in 6 months and he agrees he will work on his diet           Relevant Orders    Comprehensive metabolic panel    Lipid panel           Return in about 6 months (around 11/12/2022)  Chief Complaint   Patient presents with    Follow-up       Subjective:   Patient ID: Chavez Green is a 40 y o  male  follow-up on his labs, he says he has improved his diet but lately has been eating a lot of breads  Hx of high blood sugar     HPI    Review of Systems   Constitutional: Negative for activity change, appetite change, chills, fatigue, fever and unexpected weight change  HENT: Negative for congestion, ear discharge, ear pain, nosebleeds, postnasal drip, rhinorrhea, sinus pressure, sneezing, sore throat, trouble swallowing and voice change  Eyes: Negative for photophobia, pain, discharge, redness and itching  Respiratory: Negative for cough, chest tightness, shortness of breath and wheezing  Cardiovascular: Negative for chest pain, palpitations and leg swelling  Gastrointestinal: Negative for abdominal pain, constipation, diarrhea, nausea and vomiting  Endocrine: Negative for polyuria  Genitourinary: Negative for dysuria, frequency and urgency  Musculoskeletal: Negative for arthralgias, back pain, myalgias and neck pain  Skin: Negative for color change, pallor and rash  Allergic/Immunologic: Negative for environmental allergies and food allergies  Neurological: Negative for dizziness, weakness, light-headedness and headaches  Hematological: Negative for adenopathy  Does not bruise/bleed easily  Psychiatric/Behavioral: Negative for behavioral problems  The patient is not nervous/anxious  Objective:  Physical Exam  Vitals and nursing note reviewed  Constitutional:       Appearance: He is well-developed  HENT:      Head: Normocephalic and atraumatic  Eyes:      General: No scleral icterus  Conjunctiva/sclera: Conjunctivae normal       Pupils: Pupils are equal, round, and reactive to light  Neck:      Thyroid: No thyromegaly  Cardiovascular:      Rate and Rhythm: Normal rate and regular rhythm  Heart sounds: Normal heart sounds  No murmur heard  Pulmonary:      Effort: Pulmonary effort is normal       Breath sounds: Normal breath sounds  No wheezing or rales  Musculoskeletal:      Cervical back: Normal range of motion and neck supple  Right lower leg: No edema  Left lower leg: No edema  Lymphadenopathy:      Cervical: No cervical adenopathy  Skin:     Findings: No erythema or rash  Neurological:      Mental Status: He is alert  Psychiatric:         Mood and Affect: Mood normal             History reviewed  No pertinent surgical history      Family History   Problem Relation Age of Onset    Heart disease Mother     Diabetes Mother     Hypertension Mother     Hyperlipidemia Mother     Kidney failure Mother     Heart disease Father     Diabetes Father     Hyperlipidemia Father     Diabetes Sister     Diabetes Sister     Diabetes Sister          Current Outpatient Medications:     cetirizine (ZyrTEC) 10 mg tablet, Take 1 tablet (10 mg total) by mouth daily, Disp: 90 tablet, Rfl: 1    mometasone (NASONEX) 50 mcg/act nasal spray, 2 sprays into each nostril daily, Disp: 1 Act, Rfl: 5    Multiple Vitamin (MULTIVITAMIN ADULT PO), Take by mouth, Disp: , Rfl:     olopatadine (PATANOL) 0 1 % ophthalmic solution, Administer 1 drop to both eyes 2 (two) times a day, Disp: 5 mL, Rfl: 2    Allergies   Allergen Reactions    Penicillin G Abdominal Pain, Allergic Rhinitis, Anaphylaxis, Angioedema, Anxiety, Arthralgia, Blisters, Bradycardia, Chest Pain, Confusion, Cough, Delirium, Dermatitis, Diarrhea, Dizziness, Drowsiness, Edema, Eye Swelling, Facial Swelling, Fatigue, Fever, GI Bleeding, GI Intolerance, Hallucinations, Headache, Hemorrhagic stroke, Hives, Hyperactivity, Hypertension, Irritability, Itching, Lightheadedness, Lip Swelling, Myalgia, Nasal Congestion, Nausea Only, Other (See Comments), Palpitations, Photosensitivity, Rash, Seizures, Shortness Of Breath, Sneezing, Swelling, Syncope, Tachycardia, Throat Swelling, Tinnitus, Tongue Swelling, Tremor, Visual Disturbance, Vomiting and Wheezing    Penicillins      Reaction Date: [id number];   Unknown- infant allergy     Pollen Extract        Vitals:    05/12/22 1010   BP: 122/80   Pulse: 92   Resp: 16   SpO2: 98%   Weight: 92 1 kg (203 lb)   Height: 5' 11" (1 803 m)

## 2022-05-12 NOTE — ASSESSMENT & PLAN NOTE
Strictly states significantly elevated, discussed with him to improve his diet exercise and will repeat in 6 months and he agrees he will work on his diet

## 2022-05-12 NOTE — ASSESSMENT & PLAN NOTE
Discussed about cutting down carbs and sugars in his diet and do regular exercise, will repeat his labs with A1c in 6 month Alert and oriented to person, place and time

## 2022-05-18 ENCOUNTER — CONSULT (OUTPATIENT)
Dept: DERMATOLOGY | Facility: CLINIC | Age: 47
End: 2022-05-18
Payer: COMMERCIAL

## 2022-05-18 VITALS — WEIGHT: 205.4 LBS | HEIGHT: 71 IN | BODY MASS INDEX: 28.76 KG/M2 | TEMPERATURE: 98.2 F

## 2022-05-18 DIAGNOSIS — L98.9 SKIN LESION OF SCALP: Primary | ICD-10-CM

## 2022-05-18 PROCEDURE — 1036F TOBACCO NON-USER: CPT | Performed by: DERMATOLOGY

## 2022-05-18 PROCEDURE — 3008F BODY MASS INDEX DOCD: CPT | Performed by: DERMATOLOGY

## 2022-05-18 PROCEDURE — 99202 OFFICE O/P NEW SF 15 MIN: CPT | Performed by: DERMATOLOGY

## 2022-05-18 NOTE — PROGRESS NOTES
[facility] Note     Patient Name: Stephanie Hitchcock  Encounter Date: 05/18/2022     Have you been cared for by a [facility] Dermatologist in the last 3 years and, if so, which one? No    · Have you traveled outside of the [address] [place] [address] in the past 3 months or outside of the [facility] area in the last 2 weeks? No     May we call your Preferred Phone number to discuss your specific medical information? Yes     May we leave a detailed message that includes your specific medical information? Yes      Today's Chief Concerns:   Concern #1:  Spot of concern on scalp       Past Medical History:  Have you personally ever had or currently have any of the following? · Skin cancer (such as Melanoma, Basal Cell Carcinoma, Squamous Cell Carcinoma? (If Yes, please provide more detail)- No  · Eczema: No  · Psoriasis: No  · HIV/AIDS: No  · Hepatitis B or C: No  · Tuberculosis: No  · Systemic Immunosuppression such as Diabetes, Biologic or Immunotherapy, Chemotherapy, Organ Transplantation, Bone Marrow Transplantation (If YES, please provide more detail): No  · Radiation Treatment (If YES, please provide more detail): No  · Any other major medical conditions/concerns? 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 Spend Time with Kids     Family History:  Have any of your "first degree relatives" (parent, brother, sister, or child) had any of the following       · Skin cancer such as Melanoma or Merkel Cell Carcinoma or Pancreatic Cancer? No  · Eczema, Asthma, Hay Fever or Seasonal Allergies: No  · Psoriasis or Psoriatic Arthritis: No  · Do any other medical conditions seem to run in your family? If Yes, what condition and which relatives?   No    Current Medications:         Current Outpatient Medications:     cetirizine (ZyrTEC) 10 mg tablet, Take 1 tablet (10 mg total) by mouth daily, Disp: 90 tablet, Rfl: 1    mometasone (NASONEX) 50 mcg/act nasal spray, 2 sprays into each nostril daily, Disp: 1 Act, Rfl: 5    Multiple Vitamin (MULTIVITAMIN ADULT PO), Take by mouth, Disp: , Rfl:     olopatadine (PATANOL) 0 1 % ophthalmic solution, Administer 1 drop to both eyes 2 (two) times a day, Disp: 5 mL, Rfl: 2      Review of Systems:  Have you recently had or currently have any of the following? If YES, what are you doing for the problem? · Fever, chills or unintended weight loss: No  · Sudden loss or change in your vision: YES,   · Nausea, vomiting or blood in your stool: No  · Painful or swollen joints: No  · Wheezing or cough: No  · Changing mole or non-healing wound: YES,   · Nosebleeds: No  · Excessive sweating: No  · Easy or prolonged bleeding? No  · Over the last 2 weeks, how often have you been bothered by the following problems? · Taking little interest or pleasure in doing things: 1 - Not at All  · Feeling down, depressed, or hopeless: 1 - Not at All  · Rapid heartbeat with epinephrine:  No    · FEMALES ONLY:    · Are you pregnant or planning to become pregnant? N/A  · Are you currently or planning to be nursing or breast feeding? N/A    · Any known allergies?       Allergies   Allergen Reactions    Penicillin G Abdominal Pain, Allergic Rhinitis, Anaphylaxis, Angioedema, Anxiety, Arthralgia, Blisters, Bradycardia, Chest Pain, Confusion, Cough, Delirium, Dermatitis, Diarrhea, Dizziness, Drowsiness, Edema, Eye Swelling, Facial Swelling, Fatigue, Fever, GI Bleeding, GI Intolerance, Hallucinations, Headache, Hemorrhagic stroke, Hives, Hyperactivity, Hypertension, Irritability, Itching, Lightheadedness, Lip Swelling, Myalgia, Nasal Congestion, Nausea Only, Other (See Comments), Palpitations, Photosensitivity, Rash, Seizures, Shortness Of Breath, Sneezing, Swelling, Syncope, Tachycardia, Throat Swelling, Tinnitus, Tongue Swelling, Tremor, Visual Disturbance, Vomiting and Wheezing    Penicillins      Reaction Date: [id number]; Unknown- infant allergy     Pollen Extract          Physical Exam:     Was a chaperone (Derm Clinical Assistant) present throughout the entire Physical Exam? Yes     Did the Dermatology Team specifically  the patient on the importance of a Full Skin Exam to be sure that nothing is missed clinically? Yes}  o Did the patient ultimately request or accept a Full Skin Exam?  NO  o Did the patient specifically refuse to have the areas "under-the-bra" examined by the Dermatologist? No  o Did the patient specifically refuse to have the areas "under-the-underwear" examined by the Dermatologist? No    CONSTITUTIONAL:   Vitals:    05/18/22 1406   Temp: 98 2 °F (36 8 °C)   TempSrc: Temporal   Weight: 93 2 kg (205 lb 6 4 oz)   Height: 5' 11" (1 803 m)         PSYCH: Normal mood and affect  EYES: Normal conjunctiva  ENT: Normal lips and oral mucosa moist (gum)  CARDIOVASCULAR: No edema  RESPIRATORY: Normal respirations  HEME/LYMPH/IMMUNO:  No regional lymphadenopathy except as noted below in "ASSESSMENT AND PLAN BY DIAGNOSIS"    SKIN:  FULL ORGAN SYSTEM EXAM  Hair, Scalp, Ears, Face Normal except as noted below in Assessment   Neck, Cervical Chain Nodes Normal except as noted below in Assessment   Right Arm/Hand/Fingers Normal except as noted below in Assessment   Left Arm/Hand/Fingers Normal except as noted below in Assessment                                Assessment and Plan by Diagnosis:    History of Present Condition:     Duration:  How long has this been an issue for you?    o  Whole life times but has changed in the past few years    Location Affected:  Where on the body is this affecting you?    o  Scalp    Quality:  Is there any bleeding, pain, itch, burning/irritation, or redness associated with the skin lesion?    o  Denies    Severity:  Describe any bleeding, pain, itch, burning/irritation, or redness on a scale of 1 to 10 (with 10 being the worst)    o 3   Timing:  Does this condition seem to be there pretty constantly or do you notice it more at specific times throughout the day?    o  Denies   Context:  Have you ever noticed that this condition seems to be associated with specific activities you do?    o  Denies   Modifying Factors:    o Anything that seems to make the condition worse?    -  Denies  o What have you tried to do to make the condition better? -  Denies   Associated Signs and Symptoms:  Does this skin lesion seem to be associated with any of the following:  o  DENIES      1  SKIN LESION     Physical Exam:   Anatomic Location Affected:  Scalp    Morphological Description:  See photograph    Pertinent Positives:   Pertinent Negatives: Additional History of Present Condition:  Located on scalp  Presents for patient's life time  Patient reports over the past couple years spot has seemed to enlarge  No treatment attempted  Assessment and Plan:  Based on a thorough discussion of this condition and the management approach to it (including a comprehensive discussion of the known risks, side effects and potential benefits of treatment), the patient (family) agrees to implement the following specific plan:   Discussed with patient regarding the options of monitoring vs biopsy   He opted to monitor the lesion closely    Follow up in 2-3 months for recheck    Please call if lesion is getting rapidly larger or changing in any other way  He agreed to to this            Scribe Attestation    I,:  Lit Hooker am acting as a scribe while in the presence of the attending physician :       I,:  Ronnie Patel MD personally performed the services described in this documentation    as scribed in my presence :

## 2022-05-18 NOTE — PATIENT INSTRUCTIONS
1  SKIN LESION       Assessment and Plan:  Based on a thorough discussion of this condition and the management approach to it (including a comprehensive discussion of the known risks, side effects and potential benefits of treatment), the patient (family) agrees to implement the following specific plan:  Discussed with patient regarding the options of monitoring vs biopsy  Opted to monitor the lesion closely   Follow up in 2-3 months for recheck   Please call if lesion is getting rapidly larger

## 2022-07-28 ENCOUNTER — OFFICE VISIT (OUTPATIENT)
Dept: FAMILY MEDICINE CLINIC | Facility: CLINIC | Age: 47
End: 2022-07-28
Payer: COMMERCIAL

## 2022-07-28 VITALS
WEIGHT: 198 LBS | BODY MASS INDEX: 27.72 KG/M2 | HEIGHT: 71 IN | SYSTOLIC BLOOD PRESSURE: 110 MMHG | HEART RATE: 83 BPM | DIASTOLIC BLOOD PRESSURE: 80 MMHG | OXYGEN SATURATION: 99 % | RESPIRATION RATE: 16 BRPM

## 2022-07-28 DIAGNOSIS — G89.29 CHRONIC PAIN OF LEFT KNEE: ICD-10-CM

## 2022-07-28 DIAGNOSIS — M21.40 PES PLANUS, UNSPECIFIED LATERALITY: ICD-10-CM

## 2022-07-28 DIAGNOSIS — M25.562 CHRONIC PAIN OF LEFT KNEE: ICD-10-CM

## 2022-07-28 DIAGNOSIS — M79.651 RIGHT THIGH PAIN: Primary | ICD-10-CM

## 2022-07-28 PROCEDURE — 99214 OFFICE O/P EST MOD 30 MIN: CPT | Performed by: FAMILY MEDICINE

## 2022-07-28 NOTE — PROGRESS NOTES
Assessment/Plan:    Problem List Items Addressed This Visit        Other    Right thigh pain - Primary    Relevant Orders    US extremity soft tissue    Chronic pain of left knee     Left knee exam is normal he has full range of motion and no bony tenderness, feels improvement in knee pain after using the orthotic in the foot shoes, he will see the podiatrist as his pain could be due to the way he walks         Relevant Orders    Ambulatory Referral to Podiatry    Pes planus     His left knee pain improves with the use of orthotic in the left foot, as he has slightly flat foot, he will see the podiatrist         Relevant Orders    Ambulatory Referral to Podiatry          No follow-ups on file  Chief Complaint   Patient presents with    Leg Pain    Knee Pain       Subjective:   Patient ID: Garcia De Leon is a 40 y o  male  Left knee pain  For 1 year , on and off   Discomfort of  rt thigh medial part for 2 3  Month, he says he feels like it is sore or bruise on touching the area on the medial part of the right thigh, he says he can run walk no problem and it is not affected by the activity, he denies any injuries, and is always there, he has no weakness, no family history of any tumors,   He also complain of pain in the left knee after walking or running for few miles, he says somebody told him he has flatfoot and he tried the orthotics in the shoes which improved his knee pain so he wants to see a podiatrist to evaluate    HPI    Review of Systems   Constitutional: Negative for activity change, chills, fatigue, fever and unexpected weight change  HENT: Negative for congestion, ear discharge, ear pain, nosebleeds, postnasal drip, rhinorrhea, sinus pressure, sneezing, sore throat, trouble swallowing and voice change  Eyes: Negative for pain, discharge, redness and itching  Respiratory: Negative for cough  Cardiovascular: Negative for chest pain and leg swelling  Gastrointestinal: Negative for nausea  Endocrine: Negative for polyuria  Musculoskeletal: Positive for arthralgias  Negative for back pain, myalgias and neck pain  Skin: Negative for color change, pallor, rash and wound  Allergic/Immunologic: Negative for environmental allergies  Neurological: Negative for weakness  Hematological: Does not bruise/bleed easily  Psychiatric/Behavioral: Negative for behavioral problems  The patient is not nervous/anxious  Objective:  Physical Exam  Vitals and nursing note reviewed  Constitutional:       Appearance: He is well-developed  HENT:      Head: Normocephalic and atraumatic  Eyes:      General: No scleral icterus  Neck:      Thyroid: No thyromegaly  Trachea: No tracheal deviation  Cardiovascular:      Rate and Rhythm: Normal rate and regular rhythm  Heart sounds: Normal heart sounds  No murmur heard  Pulmonary:      Effort: Pulmonary effort is normal       Breath sounds: Normal breath sounds  Musculoskeletal:         General: No swelling  Normal range of motion  Cervical back: Normal range of motion and neck supple  Right lower leg: No edema  Left lower leg: Normal  No swelling, deformity, lacerations, tenderness or bony tenderness  No edema  Left ankle: Normal       Left foot: Normal range of motion  No swelling, deformity, foot drop, tenderness or bony tenderness  Comments: Mild tender in medial part of rt thigh , no bruise, no mass    Lymphadenopathy:      Cervical: No cervical adenopathy  Skin:     General: Skin is warm  Coloration: Skin is not jaundiced or pale  Findings: No bruising, erythema, lesion or rash  Neurological:      Mental Status: He is alert and oriented to person, place, and time  Cranial Nerves: No cranial nerve deficit  Deep Tendon Reflexes: Reflexes are normal and symmetric  Psychiatric:         Behavior: Behavior normal          Thought Content:  Thought content normal          Judgment: Judgment normal             History reviewed  No pertinent surgical history  Family History   Problem Relation Age of Onset    Heart disease Mother     Diabetes Mother     Hypertension Mother     Hyperlipidemia Mother     Kidney failure Mother     Heart disease Father     Diabetes Father     Hyperlipidemia Father     Diabetes Sister     Diabetes Sister     Diabetes Sister          Current Outpatient Medications:     cetirizine (ZyrTEC) 10 mg tablet, Take 1 tablet (10 mg total) by mouth daily, Disp: 90 tablet, Rfl: 1    mometasone (NASONEX) 50 mcg/act nasal spray, 2 sprays into each nostril daily, Disp: 1 Act, Rfl: 5    Multiple Vitamin (MULTIVITAMIN ADULT PO), Take by mouth, Disp: , Rfl:     olopatadine (PATANOL) 0 1 % ophthalmic solution, Administer 1 drop to both eyes 2 (two) times a day, Disp: 5 mL, Rfl: 2    Allergies   Allergen Reactions    Penicillin G Abdominal Pain, Allergic Rhinitis, Anaphylaxis, Angioedema, Anxiety, Arthralgia, Blisters, Bradycardia, Chest Pain, Confusion, Cough, Delirium, Dermatitis, Diarrhea, Dizziness, Drowsiness, Edema, Eye Swelling, Facial Swelling, Fatigue, Fever, GI Bleeding, GI Intolerance, Hallucinations, Headache, Hemorrhagic stroke, Hives, Hyperactivity, Hypertension, Irritability, Itching, Lightheadedness, Lip Swelling, Myalgia, Nasal Congestion, Nausea Only, Other (See Comments), Palpitations, Photosensitivity, Rash, Seizures, Shortness Of Breath, Sneezing, Swelling, Syncope, Tachycardia, Throat Swelling, Tinnitus, Tongue Swelling, Tremor, Visual Disturbance, Vomiting and Wheezing    Penicillins      Reaction Date: [id number];   Unknown- infant allergy     Pollen Extract        Vitals:    07/28/22 0931   BP: 110/80   Pulse: 83   Resp: 16   SpO2: 99%   Weight: 89 8 kg (198 lb)   Height: 5' 11" (1 803 m)

## 2022-07-28 NOTE — ASSESSMENT & PLAN NOTE
His left knee pain improves with the use of orthotic in the left foot, as he has slightly flat foot, he will see the podiatrist

## 2022-07-28 NOTE — ASSESSMENT & PLAN NOTE
Left knee exam is normal he has full range of motion and no bony tenderness, feels improvement in knee pain after using the orthotic in the foot shoes, he will see the podiatrist as his pain could be due to the way he walks

## 2022-08-02 ENCOUNTER — TELEPHONE (OUTPATIENT)
Dept: FAMILY MEDICINE CLINIC | Facility: CLINIC | Age: 47
End: 2022-08-02

## 2022-08-02 DIAGNOSIS — R22.41 MASS OF LEG, RIGHT: ICD-10-CM

## 2022-08-02 DIAGNOSIS — M79.651 RIGHT THIGH PAIN: Primary | ICD-10-CM

## 2022-08-09 DIAGNOSIS — B34.9 VIRAL INFECTION, UNSPECIFIED: Primary | ICD-10-CM

## 2022-08-09 PROCEDURE — U0005 INFEC AGEN DETEC AMPLI PROBE: HCPCS | Performed by: FAMILY MEDICINE

## 2022-08-09 PROCEDURE — U0003 INFECTIOUS AGENT DETECTION BY NUCLEIC ACID (DNA OR RNA); SEVERE ACUTE RESPIRATORY SYNDROME CORONAVIRUS 2 (SARS-COV-2) (CORONAVIRUS DISEASE [COVID-19]), AMPLIFIED PROBE TECHNIQUE, MAKING USE OF HIGH THROUGHPUT TECHNOLOGIES AS DESCRIBED BY CMS-2020-01-R: HCPCS | Performed by: FAMILY MEDICINE

## 2022-08-10 LAB — SARS-COV-2 RNA RESP QL NAA+PROBE: POSITIVE

## 2022-08-16 ENCOUNTER — HOSPITAL ENCOUNTER (OUTPATIENT)
Dept: RADIOLOGY | Facility: HOSPITAL | Age: 47
Discharge: HOME | End: 2022-08-16
Attending: PODIATRIST
Payer: COMMERCIAL

## 2022-08-16 ENCOUNTER — OFFICE VISIT (OUTPATIENT)
Dept: PODIATRY | Facility: CLINIC | Age: 47
End: 2022-08-16
Payer: COMMERCIAL

## 2022-08-16 VITALS — WEIGHT: 198 LBS | BODY MASS INDEX: 27.72 KG/M2 | HEIGHT: 71 IN

## 2022-08-16 DIAGNOSIS — M21.41 PES PLANUS OF BOTH FEET: Primary | ICD-10-CM

## 2022-08-16 DIAGNOSIS — M21.40 PES PLANUS, UNSPECIFIED LATERALITY: ICD-10-CM

## 2022-08-16 DIAGNOSIS — M25.562 CHRONIC PAIN OF LEFT KNEE: ICD-10-CM

## 2022-08-16 DIAGNOSIS — G89.29 CHRONIC PAIN OF LEFT KNEE: ICD-10-CM

## 2022-08-16 DIAGNOSIS — M21.42 PES PLANUS OF BOTH FEET: Primary | ICD-10-CM

## 2022-08-16 PROCEDURE — 73630 X-RAY EXAM OF FOOT: CPT

## 2022-08-16 PROCEDURE — 99203 OFFICE O/P NEW LOW 30 MIN: CPT | Performed by: PODIATRIST

## 2022-08-16 PROCEDURE — 73600 X-RAY EXAM OF ANKLE: CPT

## 2022-08-25 ENCOUNTER — OFFICE VISIT (OUTPATIENT)
Dept: DERMATOLOGY | Facility: CLINIC | Age: 47
End: 2022-08-25
Payer: COMMERCIAL

## 2022-08-25 VITALS — BODY MASS INDEX: 20.3 KG/M2 | WEIGHT: 145 LBS | TEMPERATURE: 97.6 F | HEIGHT: 71 IN

## 2022-08-25 DIAGNOSIS — L98.9 SKIN LESION OF SCALP: Primary | ICD-10-CM

## 2022-08-25 PROCEDURE — 99213 OFFICE O/P EST LOW 20 MIN: CPT | Performed by: DERMATOLOGY

## 2022-08-25 NOTE — PATIENT INSTRUCTIONS
SKIN LESION    Physical Exam:  Anatomic Location Affected:  Scalp   Morphological Description:  See photograph   Pertinent Positives:  Pertinent Negatives: Additional History of Present Condition:   Presents for patient's life time  Patient reports over the past couple years spot has seemed to enlarge  No prior treatment     Assessment and Plan:  Based on a thorough discussion of this condition and the management approach to it (including a comprehensive discussion of the known risks, side effects and potential benefits of treatment), the patient (family) agrees to implement the following specific plan:    Discussed options with patient to monitor versus biopsy  Patient deferred to keep monitoring for now  Follow up again in 2-3 months for recheck   Please call if lesion is getting rapidly larger or changing in any other way

## 2022-09-08 ENCOUNTER — HOSPITAL ENCOUNTER (OUTPATIENT)
Dept: ULTRASOUND IMAGING | Facility: HOSPITAL | Age: 47
Discharge: HOME | End: 2022-09-08
Attending: FAMILY MEDICINE
Payer: COMMERCIAL

## 2022-09-08 DIAGNOSIS — R22.41 MASS OF LEG, RIGHT: ICD-10-CM

## 2022-09-08 DIAGNOSIS — M79.651 RIGHT THIGH PAIN: ICD-10-CM

## 2022-09-08 PROCEDURE — 76882 US LMTD JT/FCL EVL NVASC XTR: CPT

## 2022-09-20 ENCOUNTER — TELEPHONE (OUTPATIENT)
Dept: FAMILY MEDICINE CLINIC | Facility: CLINIC | Age: 47
End: 2022-09-20

## 2022-09-20 NOTE — TELEPHONE ENCOUNTER
Patient was advised of his results, however, he is now asking what his next step is since he's still in pain?

## 2022-09-29 ENCOUNTER — OFFICE VISIT (OUTPATIENT)
Dept: FAMILY MEDICINE CLINIC | Facility: CLINIC | Age: 47
End: 2022-09-29
Payer: COMMERCIAL

## 2022-09-29 VITALS
DIASTOLIC BLOOD PRESSURE: 78 MMHG | BODY MASS INDEX: 20.3 KG/M2 | SYSTOLIC BLOOD PRESSURE: 120 MMHG | HEART RATE: 72 BPM | OXYGEN SATURATION: 99 % | HEIGHT: 71 IN | RESPIRATION RATE: 16 BRPM | WEIGHT: 145 LBS

## 2022-09-29 DIAGNOSIS — M79.651 RIGHT THIGH PAIN: Primary | ICD-10-CM

## 2022-09-29 PROCEDURE — 99213 OFFICE O/P EST LOW 20 MIN: CPT | Performed by: FAMILY MEDICINE

## 2022-09-29 NOTE — ASSESSMENT & PLAN NOTE
Pain for about 5-6 months very mild discomfort, on just localized area, no history of injury, he had ultrasound and no mass noted, on palpation there is slight tenderness on the medial part of mid thigh along the edge of the muscle, discussed with them it could be some scarring as muscle feels tired, who will see the orthopedic to get further evaluation

## 2022-09-29 NOTE — PROGRESS NOTES
Name: Steve Grider      : 1975      MRN: [id number]  Encounter Provider: Cisco Bryant MD  Encounter Date: 2022   Encounter department: [facility]     1  Right thigh pain  Assessment & Plan:  Pain for about 5-6 months very mild discomfort, on just localized area, no history of injury, he had ultrasound and no mass noted, on palpation there is slight tenderness on the medial part of mid thigh along the edge of the muscle, discussed with them it could be some scarring as muscle feels tired, who will see the orthopedic to get further evaluation    Orders:  -     Ambulatory Referral to Orthopedic Surgery; Future           Subjective     He complains of mild discomfort in the right thigh on the medial part at the mid level for last 5-6 months, he feels pain 2/10 Romain 1 point, and when he pressed on it he is slightly more discomfort 5/10  Not affected by any movements no change in pain he never saw swelling, redness or injury  He got the ultrasound of the soft tissue area and no mass noted and is very active person to regular exercise and he is concerned about why this pain is not going away  Denies tingling , or numbness    Review of Systems   Constitutional: Negative  HENT: Negative  Eyes: Negative  Respiratory: Negative  Cardiovascular: Negative  Musculoskeletal: Negative for arthralgias, back pain, gait problem, joint swelling and neck stiffness          Mild pain in rt medial thigh for 5-6 months        Past Medical History:   Diagnosis Date    Allergic rhinitis     Onset: 2008    Chronic lower back pain 2016    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    Dermatitis 3/5/2018    Diabetes ([address] )     Onset: 2009    Golfers elbow, right 2019    Hordeolum externum unspecified eye, unspecified eyelid     Onset: 2009    IBS (irritable bowel syndrome)     Onset: 2008    Impaired fasting glucose Onset: 4/28/2009    Plantar wart, left foot     Last Assessed: 12/29/2016    Plantar wart, right foot 8/20/2019    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    Rosacea     Onset: 3/21/2006    Viral wart on finger 8/20/2019     No past surgical history on file    Family History   Problem Relation Age of Onset    Heart disease Mother     Diabetes Mother     Hypertension Mother     Hyperlipidemia Mother     Kidney failure Mother     Heart disease Father     Diabetes Father     Hyperlipidemia Father     Diabetes Sister     Diabetes Sister     Diabetes Sister      Social History     Socioeconomic History    Marital status:      Spouse name: Not on file    Number of children: 2    Years of education: Not on file    Highest education level: Not on file   Occupational History    Not on file   Tobacco Use    Smoking status: Never Smoker    Smokeless tobacco: Never Used   Vaping Use    Vaping Use: Never used   Substance and Sexual Activity    Alcohol use: Never    Drug use: Never    Sexual activity: Not on file   Other Topics Concern    Not on file   Social History Narrative    Caffeine use     Social Determinants of Health     Financial Resource Strain: Not on file   Food Insecurity: Not on file   Transportation Needs: Not on file   Physical Activity: Not on file   Stress: Not on file   Social Connections: Not on file   Intimate Partner Violence: Not on file   Housing Stability: Not on file     Current Outpatient Medications on File Prior to Visit   Medication Sig    cetirizine (ZyrTEC) 10 mg tablet Take 1 tablet (10 mg total) by mouth daily    mometasone (NASONEX) 50 mcg/act nasal spray 2 sprays into each nostril daily    Multiple Vitamin (MULTIVITAMIN ADULT PO) Take by mouth    olopatadine (PATANOL) 0 1 % ophthalmic solution Administer 1 drop to both eyes 2 (two) times a day     Allergies   Allergen Reactions    Penicillin G Abdominal Pain, Allergic Rhinitis, Anaphylaxis, Angioedema, Anxiety, Arthralgia, Blisters, Bradycardia, Chest Pain, Confusion, Cough, Delirium, Dermatitis, Diarrhea, Dizziness, Drowsiness, Edema, Eye Swelling, Facial Swelling, Fatigue, Fever, GI Bleeding, GI Intolerance, Hallucinations, Headache, Hemorrhagic stroke, Hives, Hyperactivity, Hypertension, Irritability, Itching, Lightheadedness, Lip Swelling, Myalgia, Nasal Congestion, Nausea Only, Other (See Comments), Palpitations, Photosensitivity, Rash, Seizures, Shortness Of Breath, Sneezing, Swelling, Syncope, Tachycardia, Throat Swelling, Tinnitus, Tongue Swelling, Tremor, Visual Disturbance, Vomiting and Wheezing    Penicillins      Reaction Date: [id number]; Unknown- infant allergy     Pollen Extract      Immunization History   Administered Date(s) Administered    COVID-19 PFIZER VACCINE 0 3 ML IM 03/23/2021, 04/14/2021, 12/07/2021    INFLUENZA 10/09/2015, 12/02/2016, 10/06/2017, 10/01/2018    Influenza Quadrivalent, 6-35 Months IM 10/09/2015, 12/02/2016, 10/06/2017    Influenza, seasonal, injectable 10/28/2008, 09/25/2009    Td (adult), adsorbed 01/28/2011    Tdap 04/29/2021       Objective     /78   Pulse 72   Resp 16   Ht 5' 11" (1 803 m)   Wt 65 8 kg (145 lb)   SpO2 99%   BMI 20 22 kg/m²     Physical Exam  Vitals and nursing note reviewed  Constitutional:       Appearance: He is well-developed  HENT:      Head: Normocephalic and atraumatic  Eyes:      General: No scleral icterus  Conjunctiva/sclera: Conjunctivae normal       Pupils: Pupils are equal, round, and reactive to light  Neck:      Thyroid: No thyromegaly  Cardiovascular:      Rate and Rhythm: Normal rate and regular rhythm  Heart sounds: Normal heart sounds  No murmur heard  Pulmonary:      Effort: Pulmonary effort is normal       Breath sounds: Normal breath sounds  No wheezing or rales  Musculoskeletal:         General: No swelling, deformity or signs of injury  Normal range of motion        Cervical back: Normal range of motion and neck supple  Right lower leg: No edema  Left lower leg: No edema  Comments: Slight tender medial part of mid thigh , muscle is tight there , no mass , no erythema    Lymphadenopathy:      Cervical: No cervical adenopathy  Skin:     Findings: No erythema or rash  Neurological:      Mental Status: He is alert         Barb MD Jean [place]

## 2022-10-11 PROBLEM — Z13.6 SCREENING FOR CARDIOVASCULAR CONDITION: Status: RESOLVED | Noted: 2021-04-22 | Resolved: 2022-10-11

## 2022-10-12 PROBLEM — Z12.11 SCREENING FOR COLON CANCER: Status: RESOLVED | Noted: 2021-04-22 | Resolved: 2022-10-12

## 2022-10-18 ENCOUNTER — APPOINTMENT (OUTPATIENT)
Dept: LAB | Facility: CLINIC | Age: 47
End: 2022-10-18
Payer: COMMERCIAL

## 2022-10-18 DIAGNOSIS — R73.01 IMPAIRED FASTING BLOOD SUGAR: ICD-10-CM

## 2022-10-18 DIAGNOSIS — E78.1 HYPERTRIGLYCERIDEMIA: ICD-10-CM

## 2022-10-18 LAB
ALBUMIN SERPL BCP-MCNC: 4.2 G/DL (ref 3.5–5)
ALP SERPL-CCNC: 45 U/L (ref 34–104)
ALT SERPL W P-5'-P-CCNC: 27 U/L (ref 7–52)
ANION GAP SERPL CALCULATED.3IONS-SCNC: 6 MMOL/L (ref 4–13)
AST SERPL W P-5'-P-CCNC: 22 U/L (ref 13–39)
BILIRUB SERPL-MCNC: 1.61 MG/DL (ref 0.2–1)
BUN SERPL-MCNC: 10 MG/DL (ref 5–25)
CALCIUM SERPL-MCNC: 9.6 MG/DL (ref 8.4–10.2)
CHLORIDE SERPL-SCNC: 104 MMOL/L (ref 96–108)
CHOLEST SERPL-MCNC: 145 MG/DL
CO2 SERPL-SCNC: 29 MMOL/L (ref 21–32)
CREAT SERPL-MCNC: 0.98 MG/DL (ref 0.6–1.3)
EST. AVERAGE GLUCOSE BLD GHB EST-MCNC: 105 MG/DL
GFR SERPL CREATININE-BSD FRML MDRD: 92 ML/MIN/1.73SQ M
GLUCOSE P FAST SERPL-MCNC: 90 MG/DL (ref 65–99)
HBA1C MFR BLD: 5.3 %
HDLC SERPL-MCNC: 33 MG/DL
LDLC SERPL CALC-MCNC: 67 MG/DL (ref 0–100)
NONHDLC SERPL-MCNC: 112 MG/DL
POTASSIUM SERPL-SCNC: 3.9 MMOL/L (ref 3.5–5.3)
PROT SERPL-MCNC: 6.8 G/DL (ref 6.4–8.4)
SODIUM SERPL-SCNC: 139 MMOL/L (ref 135–147)
TRIGL SERPL-MCNC: 223 MG/DL

## 2022-10-18 PROCEDURE — 83036 HEMOGLOBIN GLYCOSYLATED A1C: CPT

## 2022-10-18 PROCEDURE — 80053 COMPREHEN METABOLIC PANEL: CPT

## 2022-10-18 PROCEDURE — 80061 LIPID PANEL: CPT

## 2022-10-18 PROCEDURE — 36415 COLL VENOUS BLD VENIPUNCTURE: CPT

## 2022-11-23 ENCOUNTER — OFFICE VISIT (OUTPATIENT)
Dept: DERMATOLOGY | Facility: CLINIC | Age: 47
End: 2022-11-23

## 2022-11-23 VITALS — TEMPERATURE: 97.9 F

## 2022-11-23 DIAGNOSIS — L82.1 SEBORRHEIC KERATOSIS: Primary | ICD-10-CM

## 2022-11-23 NOTE — PROGRESS NOTES
[facility] Note     Patient Name: Roger Cleveland  Encounter Date: 11/23/2022     Have you been cared for by a [place] [address] Dermatologist in the last 3 years and, if so, which description applies to you? Yes  I have been here within the last 3 years, and my medical history has NOT changed since that time  I am MALE/not capable of bearing children  REVIEW OF SYSTEMS:  Have you recently had or currently have any of the following? · No changes in my recent health  PAST MEDICAL HISTORY:  Have you personally ever had or currently have any of the following? If "YES," then please provide more detail  · No changes in my medical history  FAMILY HISTORY:  Any "first degree relatives" (parent, brother, sister, or child) with the following? • No changes in my family's known health  PATIENT EXPERIENCE:    • Do you want the Dermatologist to perform a COMPLETE skin exam today including a clinical examination under the "bra and underwear" areas? NO  • If necessary, do we have your permission to call and leave a detailed message on your Preferred Phone number that includes your specific medical information? Yes      Allergies   Allergen Reactions   • Penicillin G Abdominal Pain, Allergic Rhinitis, Anaphylaxis, Angioedema, Anxiety, Arthralgia, Blisters, Bradycardia, Chest Pain, Confusion, Cough, Delirium, Dermatitis, Diarrhea, Dizziness, Drowsiness, Edema, Eye Swelling, Facial Swelling, Fatigue, Fever, GI Bleeding, GI Intolerance, Hallucinations, Headache, Hemorrhagic stroke, Hives, Hyperactivity, Hypertension, Irritability, Itching, Lightheadedness, Lip Swelling, Myalgia, Nasal Congestion, Nausea Only, Other (See Comments), Palpitations, Photosensitivity, Rash, Seizures, Shortness Of Breath, Sneezing, Swelling, Syncope, Tachycardia, Throat Swelling, Tinnitus, Tongue Swelling, Tremor, Visual Disturbance, Vomiting and Wheezing   • Penicillins      Reaction Date: [id number];   Unknown- infant allergy • Pollen Extract       Current Outpatient Medications:   •  cetirizine (ZyrTEC) 10 mg tablet, Take 1 tablet (10 mg total) by mouth daily, Disp: 90 tablet, Rfl: 1  •  mometasone (NASONEX) 50 mcg/act nasal spray, 2 sprays into each nostril daily, Disp: 1 Act, Rfl: 5  •  Multiple Vitamin (MULTIVITAMIN ADULT PO), Take by mouth, Disp: , Rfl:   •  olopatadine (PATANOL) 0 1 % ophthalmic solution, Administer 1 drop to both eyes 2 (two) times a day, Disp: 5 mL, Rfl: 2          • Whom besides the patient is providing clinical information about today's encounter?   o NO ADDITIONAL HISTORIAN (patient alone provided history)    Physical Exam and Assessment/Plan by Diagnosis:      1  SEBORRHEIC KERATOSIS; NON-INFLAMED    Physical Exam:  • Anatomic Location Affected:  Left scalp  • Morphological Description: Waxy tan epidermal papule with horn pseudocysts    • Pertinent Positives: - No changes have appeared at this time  • g    Additional History of Present Condition:  Patient did not report new bumps on the skin  Denies itch, burn, pain, bleeding or ulceration  Present constantly; nothing seems to make it worse or better  No prior treatment  Assessment and Plan:  Based on a thorough discussion of this condition and the management approach to it (including a comprehensive discussion of the known risks, side effects and potential benefits of treatment), the patient (family) agrees to implement the following specific plan:    • Discussed continuing monitoring for another 6 months for changes  If changes appear follow up right away with Dr Aldo Benitez  • Discussed the possibility of having the lesion removed, but would be replacing the lesion for a bald spot  Seborrheic Keratosis  A seborrheic keratosis is a harmless warty spot that appears during adult life as a common sign of skin aging  Seborrheic keratoses can arise on any area of skin, covered or uncovered, with the usual exception of the palms and soles   They do not arise from mucous membranes  Seborrheic keratoses can have highly variable appearance  Seborrheic keratoses are extremely common  It has been estimated that over 90% of adults over the age of 61 years have one or more of them  They occur in males and females of all races, typically beginning to erupt in the 35s or 45s  They are uncommon under the age of 21 years  The precise cause of seborrhoeic keratoses is not known  Seborrhoeic keratoses are considered degenerative in nature  As time goes by, seborrheic keratoses tend to become more numerous  Some people inherit a tendency to develop a very large number of them; some people may have hundreds of them  The name "seborrheic keratosis" is misleading, because these lesions are not limited to a seborrhoeic distribution (scalp, mid-face, chest, upper back), nor are they formed from sebaceous glands, nor are they associated with sebum -- which is greasy  Seborrheic keratosis may also be called "SK," "Seb K," "basal cell papilloma," "senile wart," or "barnacle "      Researchers have noted:  • Eruptive seborrhoeic keratoses can follow sunburn or dermatitis  • Skin friction may be the reason they appear in body folds  • Viral cause (e g , human papillomavirus) seems unlikely  • Stable and clonal mutations or activation of FRFR3, PIK3CA, BRENNAN, AKT1 and EGFR genes are found in seborrhoeic keratoses  • Seborrhoeic keratosis can arise from solar lentigo  • FRFR3 mutations also arise in solar lentigines  These mutations are associated with increased age and location on the head and neck, suggesting a role of ultraviolet radiation in these lesions  • Seborrheic keratoses do not harbour tumour suppressor gene mutations  • Epidermal growth factor receptor inhibitors, which are used to treat some cancers, often result in an increase in verrucal (warty) keratoses  There is no easy way to remove multiple lesions on a single occasion    Unless a specific lesion is "inflamed" and is causing pain or stinging/burning or is bleeding, most insurance companies do not authorize treatment      Scribe Attestation    I,:  Jorge A Flores am acting as a scribe while in the presence of the attending physician :       I,:  Shaheen Kulkarni MD personally performed the services described in this documentation    as scribed in my presence :

## 2022-11-23 NOTE — PATIENT INSTRUCTIONS
SEBORRHEIC KERATOSIS; NON-INFLAMED    Assessment and Plan:  Based on a thorough discussion of this condition and the management approach to it (including a comprehensive discussion of the known risks, side effects and potential benefits of treatment), the patient (family) agrees to implement the following specific plan:    Discussed continuing monitoring for another 6 months for changes  If changes appear follow up right away with Dr Parag Tom  Discussed the possibility of having the lesion removed, but would be replacing the lesion for a bald spot  Seborrheic Keratosis  A seborrheic keratosis is a harmless warty spot that appears during adult life as a common sign of skin aging  Seborrheic keratoses can arise on any area of skin, covered or uncovered, with the usual exception of the palms and soles  They do not arise from mucous membranes  Seborrheic keratoses can have highly variable appearance  Seborrheic keratoses are extremely common  It has been estimated that over 90% of adults over the age of 61 years have one or more of them  They occur in males and females of all races, typically beginning to erupt in the 35s or 45s  They are uncommon under the age of 21 years  The precise cause of seborrhoeic keratoses is not known  Seborrhoeic keratoses are considered degenerative in nature  As time goes by, seborrheic keratoses tend to become more numerous  Some people inherit a tendency to develop a very large number of them; some people may have hundreds of them  The name "seborrheic keratosis" is misleading, because these lesions are not limited to a seborrhoeic distribution (scalp, mid-face, chest, upper back), nor are they formed from sebaceous glands, nor are they associated with sebum -- which is greasy    Seborrheic keratosis may also be called "SK," "Seb K," "basal cell papilloma," "senile wart," or "barnacle "      Researchers have noted:  Eruptive seborrhoeic keratoses can follow sunburn or dermatitis  Skin friction may be the reason they appear in body folds  Viral cause (e g , human papillomavirus) seems unlikely  Stable and clonal mutations or activation of FRFR3, PIK3CA, BRENNAN, AKT1 and EGFR genes are found in seborrhoeic keratoses  Seborrhoeic keratosis can arise from solar lentigo  FRFR3 mutations also arise in solar lentigines  These mutations are associated with increased age and location on the head and neck, suggesting a role of ultraviolet radiation in these lesions  Seborrheic keratoses do not harbour tumour suppressor gene mutations  Epidermal growth factor receptor inhibitors, which are used to treat some cancers, often result in an increase in verrucal (warty) keratoses  There is no easy way to remove multiple lesions on a single occasion  Unless a specific lesion is "inflamed" and is causing pain or stinging/burning or is bleeding, most insurance companies do not authorize treatment

## 2022-11-28 ENCOUNTER — OFFICE VISIT (OUTPATIENT)
Dept: FAMILY MEDICINE CLINIC | Facility: CLINIC | Age: 47
End: 2022-11-28

## 2022-11-28 VITALS
DIASTOLIC BLOOD PRESSURE: 82 MMHG | BODY MASS INDEX: 26.04 KG/M2 | SYSTOLIC BLOOD PRESSURE: 124 MMHG | HEART RATE: 84 BPM | OXYGEN SATURATION: 98 % | WEIGHT: 186 LBS | HEIGHT: 71 IN | RESPIRATION RATE: 16 BRPM

## 2022-11-28 DIAGNOSIS — E78.1 HYPERTRIGLYCERIDEMIA: Primary | ICD-10-CM

## 2022-11-28 DIAGNOSIS — R17 ELEVATED BILIRUBIN: ICD-10-CM

## 2022-11-28 DIAGNOSIS — M79.651 RIGHT THIGH PAIN: ICD-10-CM

## 2022-11-28 DIAGNOSIS — Z23 NEEDS FLU SHOT: ICD-10-CM

## 2022-11-28 PROBLEM — R73.01 IMPAIRED FASTING BLOOD SUGAR: Status: RESOLVED | Noted: 2022-05-12 | Resolved: 2022-11-28

## 2022-11-28 NOTE — PROGRESS NOTES
Name: Isamar Rivera      : 1975      MRN: [id number]  Encounter Provider: Rod Faustin MD  Encounter Date: 2022   Encounter department: [facility]     1  Hypertriglyceridemia  Assessment & Plan:  Triglycerides are improving he improved his diet and he will also watch to avoid excessive carbs like rice     Orders:  -     CBC and differential; Future; Expected date: 2023  -     Comprehensive metabolic panel; Future; Expected date: 2023  -     Lipid panel; Future; Expected date: 2023    2  Needs flu shot  -     influenza vaccine, quadrivalent, 0 5 mL, preservative-free, for adult and pediatric patients 6 mos+ (AFLURIA, FLUARIX, FLULAVAL, FLUZONE)    3  Right thigh pain  Assessment & Plan:  Who still feels discomfort in the right upper leg in the medial part, and did not see the orthopedic  Reprinted his referal      4  Elevated bilirubin  Assessment & Plan:  Slightly elevated bilirubin, encouraged to drink more fluid      His labs reviewed, his blood sugars normal    Depression Screening and Follow-up Plan: Patient was screened for depression during today's encounter  They screened negative with a PHQ-2 score of 0  Subjective     He Is here for review of his labs, which were done in October  He overall had changes lifestyle and eating more vegan diet and reduce the animal products, he says his weight has been maintained, he did not gain any weight his triglycerides are also improving, did not see the orthopedic care for his right leg discomfort, previous ultrasound for the soft tissue was normal and there is no lumbar he feels discomfort    Review of Systems   Constitutional: Negative for activity change, appetite change, chills, fatigue, fever and unexpected weight change     HENT: Negative for congestion, ear discharge, ear pain, nosebleeds, postnasal drip, rhinorrhea, sinus pressure, sneezing, sore throat, trouble swallowing and voice change  Eyes: Negative for photophobia, pain, discharge, redness and itching  Respiratory: Negative for cough, chest tightness, shortness of breath and wheezing  Cardiovascular: Negative for chest pain, palpitations and leg swelling  Gastrointestinal: Negative for abdominal pain, constipation, diarrhea, nausea and vomiting  Endocrine: Negative for polyuria  Genitourinary: Negative for dysuria, frequency and urgency  Musculoskeletal: Negative for arthralgias, back pain, myalgias and neck pain  Discomfort in the medial part of the right thigh   Skin: Negative for color change, pallor and rash  Allergic/Immunologic: Negative for environmental allergies and food allergies  Neurological: Negative for dizziness, weakness, light-headedness and headaches  Hematological: Negative for adenopathy  Does not bruise/bleed easily  Psychiatric/Behavioral: Negative for behavioral problems  The patient is not nervous/anxious  Past Medical History:   Diagnosis Date   • Allergic rhinitis     Onset: 4/28/2008   • Chronic lower back pain 12/29/2016   • 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/2012   • Dermatitis 3/5/2018   • Diabetes ([address] )     Onset: 4/14/2009   • Golfers elbow, right 8/20/2019   • Hordeolum externum unspecified eye, unspecified eyelid     Onset: 6/30/2009   • IBS (irritable bowel syndrome)     Onset: 5/23/2008   • Impaired fasting glucose     Onset: 4/28/2009   • Plantar wart, left foot     Last Assessed: 12/29/2016   • Plantar wart, right foot 8/20/2019   • 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   • Rosacea     Onset: 3/21/2006   • Viral wart on finger 8/20/2019     History reviewed  No pertinent surgical history    Family History   Problem Relation Age of Onset   • Heart disease Mother    • Diabetes Mother    • Hypertension Mother    • Hyperlipidemia Mother    • Kidney failure Mother    • Heart disease Father    • Diabetes Father    • Hyperlipidemia Father    • Diabetes Sister • Diabetes Sister    • Diabetes Sister      Social History     Socioeconomic History   • Marital status:      Spouse name: None   • Number of children: 2   • Years of education: None   • Highest education level: None   Occupational History   • None   Tobacco Use   • Smoking status: Never   • Smokeless tobacco: Never   Vaping Use   • Vaping Use: Never used   Substance and Sexual Activity   • Alcohol use: Never   • Drug use: Never   • Sexual activity: None   Other Topics Concern   • None   Social History Narrative    Caffeine use     Social Determinants of Health     Financial Resource Strain: Not on file   Food Insecurity: Not on file   Transportation Needs: Not on file   Physical Activity: Not on file   Stress: Not on file   Social Connections: Not on file   Intimate Partner Violence: Not on file   Housing Stability: Not on file     Current Outpatient Medications on File Prior to Visit   Medication Sig   • cetirizine (ZyrTEC) 10 mg tablet Take 1 tablet (10 mg total) by mouth daily   • mometasone (NASONEX) 50 mcg/act nasal spray 2 sprays into each nostril daily   • Multiple Vitamin (MULTIVITAMIN ADULT PO) Take by mouth   • olopatadine (PATANOL) 0 1 % ophthalmic solution Administer 1 drop to both eyes 2 (two) times a day     Allergies   Allergen Reactions   • Penicillin G Abdominal Pain, Allergic Rhinitis, Anaphylaxis, Angioedema, Anxiety, Arthralgia, Blisters, Bradycardia, Chest Pain, Confusion, Cough, Delirium, Dermatitis, Diarrhea, Dizziness, Drowsiness, Edema, Eye Swelling, Facial Swelling, Fatigue, Fever, GI Bleeding, GI Intolerance, Hallucinations, Headache, Hemorrhagic stroke, Hives, Hyperactivity, Hypertension, Irritability, Itching, Lightheadedness, Lip Swelling, Myalgia, Nasal Congestion, Nausea Only, Other (See Comments), Palpitations, Photosensitivity, Rash, Seizures, Shortness Of Breath, Sneezing, Swelling, Syncope, Tachycardia, Throat Swelling, Tinnitus, Tongue Swelling, Tremor, Visual Disturbance, Vomiting and Wheezing   • Penicillins      Reaction Date: [id number]; Unknown- infant allergy    • Pollen Extract      Immunization History   Administered Date(s) Administered   • COVID-19 PFIZER VACCINE 0 3 ML IM 03/23/2021, 04/14/2021, 12/07/2021   • INFLUENZA 10/09/2015, 12/02/2016, 10/06/2017, 10/01/2018   • Influenza Quadrivalent, 6-35 Months IM 10/09/2015, 12/02/2016, 10/06/2017   • Influenza, injectable, quadrivalent, preservative free 0 5 mL 11/28/2022   • Influenza, seasonal, injectable 10/28/2008, 09/25/2009   • Td (adult), adsorbed 01/28/2011   • Tdap 04/29/2021       Objective     /82   Pulse 84   Resp 16   Ht 5' 11" (1 803 m)   Wt 84 4 kg (186 lb)   SpO2 98%   BMI 25 94 kg/m²     Physical Exam  Vitals and nursing note reviewed  Constitutional:       Appearance: He is well-developed and well-nourished  HENT:      Head: Atraumatic  Mouth/Throat:      Mouth: Oropharynx is clear and moist    Eyes:      General: No scleral icterus  Extraocular Movements: EOM normal       Conjunctiva/sclera: Conjunctivae normal       Pupils: Pupils are equal, round, and reactive to light  Neck:      Thyroid: No thyromegaly  Cardiovascular:      Rate and Rhythm: Normal rate and regular rhythm  Heart sounds: Normal heart sounds  No murmur heard  Pulmonary:      Effort: Pulmonary effort is normal       Breath sounds: Normal breath sounds  No wheezing or rales  Musculoskeletal:      Cervical back: Normal range of motion and neck supple  Right lower leg: No edema  Left lower leg: No edema  Lymphadenopathy:      Cervical: No cervical adenopathy  Skin:     Findings: No erythema or rash  Neurological:      General: No focal deficit present  Mental Status: He is alert     Psychiatric:         Mood and Affect: Mood and affect and mood normal        Rod Faustin MD

## 2022-11-28 NOTE — ASSESSMENT & PLAN NOTE
Who still feels discomfort in the right upper leg in the medial part, and did not see the orthopedic  Reprinted his referal

## 2022-11-28 NOTE — ASSESSMENT & PLAN NOTE
Triglycerides are improving he improved his diet and he will also watch to avoid excessive carbs like rice

## 2023-02-08 ENCOUNTER — OFFICE VISIT (OUTPATIENT)
Dept: OBGYN CLINIC | Facility: CLINIC | Age: 48
End: 2023-02-08

## 2023-02-08 VITALS
WEIGHT: 186 LBS | DIASTOLIC BLOOD PRESSURE: 79 MMHG | HEIGHT: 71 IN | BODY MASS INDEX: 26.04 KG/M2 | HEART RATE: 67 BPM | SYSTOLIC BLOOD PRESSURE: 121 MMHG

## 2023-02-08 DIAGNOSIS — R20.8 ALLODYNIA: Primary | ICD-10-CM

## 2023-02-08 NOTE — PROGRESS NOTES
Assessment:  1  Allodynia  Ambulatory Referral to Neurology        Patient Active Problem List   Diagnosis   • Allergic rhinitis   • Chronic right-sided low back pain without sciatica   • Need for hepatitis C screening test   • Overweight (BMI 25 0-29  9)   • Hypertriglyceridemia   • Elevated bilirubin   • Elevated C-reactive protein (CRP)   • Needs flu shot   • Change in skin mole   • Right thigh pain   • Chronic pain of left knee   • Pes planus           Plan  64-year-old male with right medial thigh allodynia    · Will be referring patient to Neurgology   · Recommended patient to take Vit B complex   · Follow up PRN           Subjective:     Patient ID:    Chief Complaint:Shakeel Tate 56 y o  male      HPI    Patient comes in today with regards to right thigh pain  He was referred by his PCP Dr Tracie Sin  He states he has been having rigmedial aspect of the right thigh pain for the last 9 months  Denies any injury  He states he has hypersensitively, numbness  and tenderness to the touch  He denies groin pain  He denies any pain with walking or working out  He had a ultrasound of the right thigh on 9/8/22 which was normal  He notes occasional low back pain  He states his pain is  2/10 at its best and at its worse a 7/10           The following portions of the patient's history were reviewed and updated as appropriate: allergies, current medications, past family history, past social history, past surgical history and problem list         Social History     Socioeconomic History   • Marital status:      Spouse name: Not on file   • Number of children: 2   • Years of education: Not on file   • Highest education level: Not on file   Occupational History   • Not on file   Tobacco Use   • Smoking status: Never   • Smokeless tobacco: Never   Vaping Use   • Vaping Use: Never used   Substance and Sexual Activity   • Alcohol use: Never   • Drug use: Never   • Sexual activity: Not on file   Other Topics Concern   • Not on file   Social History Narrative    Caffeine use     Social Determinants of Health     Financial Resource Strain: Not on file   Food Insecurity: Not on file   Transportation Needs: Not on file   Physical Activity: Not on file   Stress: Not on file   Social Connections: Not on file   Intimate Partner Violence: Not on file   Housing Stability: Not on file     Past Medical History:   Diagnosis Date   • Allergic rhinitis     Onset: 4/28/2008   • Chronic lower back pain 12/29/2016   • 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/2012   • Dermatitis 3/5/2018   • Diabetes ([address] )     Onset: 4/14/2009   • Golfers elbow, right 8/20/2019   • Hordeolum externum unspecified eye, unspecified eyelid     Onset: 6/30/2009   • IBS (irritable bowel syndrome)     Onset: 5/23/2008   • Impaired fasting glucose     Onset: 4/28/2009   • Plantar wart, left foot     Last Assessed: 12/29/2016   • Plantar wart, right foot 8/20/2019   • 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   • Rosacea     Onset: 3/21/2006   • Viral wart on finger 8/20/2019     History reviewed  No pertinent surgical history  Allergies   Allergen Reactions   • Penicillin G Abdominal Pain, Allergic Rhinitis, Anaphylaxis, Angioedema, Anxiety, Arthralgia, Blisters, Bradycardia, Chest Pain, Confusion, Cough, Delirium, Dermatitis, Diarrhea, Dizziness, Drowsiness, Edema, Eye Swelling, Facial Swelling, Fatigue, Fever, GI Bleeding, GI Intolerance, Hallucinations, Headache, Hemorrhagic stroke, Hives, Hyperactivity, Hypertension, Irritability, Itching, Lightheadedness, Lip Swelling, Myalgia, Nasal Congestion, Nausea Only, Other (See Comments), Palpitations, Photosensitivity, Rash, Seizures, Shortness Of Breath, Sneezing, Swelling, Syncope, Tachycardia, Throat Swelling, Tinnitus, Tongue Swelling, Tremor, Visual Disturbance, Vomiting and Wheezing   • Penicillins      Reaction Date: [id number];   Unknown- infant allergy    • Pollen Extract      Current Outpatient Medications on File Prior to Visit   Medication Sig Dispense Refill   • cetirizine (ZyrTEC) 10 mg tablet Take 1 tablet (10 mg total) by mouth daily 90 tablet 1   • mometasone (NASONEX) 50 mcg/act nasal spray 2 sprays into each nostril daily 1 Act 5   • Multiple Vitamin (MULTIVITAMIN ADULT PO) Take by mouth     • olopatadine (PATANOL) 0 1 % ophthalmic solution Administer 1 drop to both eyes 2 (two) times a day 5 mL 2     No current facility-administered medications on file prior to visit  Objective:    Review of Systems   Constitutional: Negative for chills and fever  HENT: Negative for ear pain and sore throat  Eyes: Negative for pain and visual disturbance  Respiratory: Negative for cough and shortness of breath  Cardiovascular: Negative for chest pain and palpitations  Gastrointestinal: Negative for abdominal pain and vomiting  Genitourinary: Negative for dysuria and hematuria  Musculoskeletal: Positive for myalgias  Skin: Negative for color change and rash  Neurological: Negative for seizures and syncope  All other systems reviewed and are negative  Back Exam     Tenderness   The patient is experiencing no tenderness  Range of Motion   The patient has normal back ROM  Comments:  2 inch patch of skin with numbness/hypersensitivity  Negative femoral nerve tension sign              Physical Exam  Constitutional:       Appearance: He is well-developed  HENT:      Right Ear: External ear normal       Left Ear: External ear normal    Eyes:      Pupils: Pupils are equal, round, and reactive to light  Cardiovascular:      Rate and Rhythm: Normal rate and regular rhythm  Pulmonary:      Effort: Pulmonary effort is normal       Breath sounds: Normal breath sounds  Skin:     General: Skin is warm and dry  Neurological:      Mental Status: He is alert and oriented to person, place, and time  Psychiatric:         Behavior: Behavior normal          Thought Content:  Thought content normal  Procedures  No Procedures performed today    IMAGING   Not reviewed     Scribe Attestation    I,:  Sreedhar Kurt am acting as a scribe while in the presence of the attending physician :       I,:  Iván Thao, DO personally performed the services described in this documentation    as scribed in my presence :             Portions of the record may have been created with voice recognition software   Occasional wrong word or "sound a like" substitutions may have occurred due to the inherent limitations of voice recognition software   Read the chart carefully and recognize, using context, where substitutions have occurred

## 2023-02-24 ENCOUNTER — ANESTHESIA (OUTPATIENT)
Dept: GASTROENTEROLOGY | Facility: AMBULARY SURGERY CENTER | Age: 48
End: 2023-02-24

## 2023-02-24 ENCOUNTER — HOSPITAL ENCOUNTER (OUTPATIENT)
Dept: GASTROENTEROLOGY | Facility: AMBULARY SURGERY CENTER | Age: 48
Setting detail: OUTPATIENT SURGERY
End: 2023-02-24
Attending: COLON & RECTAL SURGERY

## 2023-02-24 ENCOUNTER — ANESTHESIA EVENT (OUTPATIENT)
Dept: GASTROENTEROLOGY | Facility: AMBULARY SURGERY CENTER | Age: 48
End: 2023-02-24

## 2023-02-24 VITALS
RESPIRATION RATE: 22 BRPM | DIASTOLIC BLOOD PRESSURE: 66 MMHG | OXYGEN SATURATION: 98 % | HEART RATE: 84 BPM | HEIGHT: 72 IN | WEIGHT: 184 LBS | BODY MASS INDEX: 24.92 KG/M2 | TEMPERATURE: 98.3 F | SYSTOLIC BLOOD PRESSURE: 109 MMHG

## 2023-02-24 DIAGNOSIS — Z12.11 COLON CANCER SCREENING: ICD-10-CM

## 2023-02-24 RX ORDER — PROPOFOL 10 MG/ML
INJECTION, EMULSION INTRAVENOUS AS NEEDED
Status: DISCONTINUED | OUTPATIENT
Start: 2023-02-24 | End: 2023-02-24

## 2023-02-24 RX ORDER — SODIUM CHLORIDE, SODIUM LACTATE, POTASSIUM CHLORIDE, CALCIUM CHLORIDE 600; 310; 30; 20 MG/100ML; MG/100ML; MG/100ML; MG/100ML
INJECTION, SOLUTION INTRAVENOUS CONTINUOUS PRN
Status: DISCONTINUED | OUTPATIENT
Start: 2023-02-24 | End: 2023-02-24

## 2023-02-24 RX ADMIN — PROPOFOL 50 MG: 10 INJECTION, EMULSION INTRAVENOUS at 09:48

## 2023-02-24 RX ADMIN — PROPOFOL 50 MG: 10 INJECTION, EMULSION INTRAVENOUS at 09:55

## 2023-02-24 RX ADMIN — PROPOFOL 50 MG: 10 INJECTION, EMULSION INTRAVENOUS at 09:49

## 2023-02-24 RX ADMIN — PROPOFOL 50 MG: 10 INJECTION, EMULSION INTRAVENOUS at 09:52

## 2023-02-24 RX ADMIN — PROPOFOL 50 MG: 10 INJECTION, EMULSION INTRAVENOUS at 09:53

## 2023-02-24 RX ADMIN — PROPOFOL 50 MG: 10 INJECTION, EMULSION INTRAVENOUS at 09:51

## 2023-02-24 RX ADMIN — PROPOFOL 50 MG: 10 INJECTION, EMULSION INTRAVENOUS at 09:46

## 2023-02-24 RX ADMIN — SODIUM CHLORIDE, SODIUM LACTATE, POTASSIUM CHLORIDE, AND CALCIUM CHLORIDE: .6; .31; .03; .02 INJECTION, SOLUTION INTRAVENOUS at 08:59

## 2023-02-24 RX ADMIN — PROPOFOL 100 MG: 10 INJECTION, EMULSION INTRAVENOUS at 09:44

## 2023-02-24 NOTE — ANESTHESIA POSTPROCEDURE EVALUATION
Post-Op Assessment Note    CV Status:  Stable  Pain Score: 0    Pain management: adequate     Mental Status:  Alert and awake   Hydration Status:  Euvolemic   PONV Controlled:  Controlled   Airway Patency:  Patent      Post Op Vitals Reviewed: Yes      Staff: CRNA         No notable events documented      BP   109/67   Temp      Pulse  84   Resp   16   SpO2   100

## 2023-02-24 NOTE — H&P
History and Physical   Colon and Rectal Surgery   Hyun Llanos 56 y o  male MRN: [id number]  Unit/Bed#:  Encounter: [id number]  02/24/23   @NOW    No chief complaint on file  History of Present Illness   HPI:  Hyun Llanos is a 56 y o  male who presents for screening colonoscopy    Historical Information   Past Medical History:   Diagnosis Date   • Allergic rhinitis     Onset: 4/28/2008   • Chronic lower back pain 12/29/2016   • 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/2012   • Dermatitis 3/5/2018   • Diabetes ([address] )     Onset: 4/14/2009   • Golfers elbow, right 8/20/2019   • Hordeolum externum unspecified eye, unspecified eyelid     Onset: 6/30/2009   • IBS (irritable bowel syndrome)     Onset: 5/23/2008   • Impaired fasting glucose     Onset: 4/28/2009   • Plantar wart, left foot     Last Assessed: 12/29/2016   • Plantar wart, right foot 8/20/2019   • 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   • Rosacea     Onset: 3/21/2006   • Viral wart on finger 8/20/2019     No past surgical history on file      Meds/Allergies     (Not in a hospital admission)        Current Outpatient Medications:   •  cetirizine (ZyrTEC) 10 mg tablet, Take 1 tablet (10 mg total) by mouth daily, Disp: 90 tablet, Rfl: 1  •  mometasone (NASONEX) 50 mcg/act nasal spray, 2 sprays into each nostril daily, Disp: 1 Act, Rfl: 5  •  Multiple Vitamin (MULTIVITAMIN ADULT PO), Take by mouth, Disp: , Rfl:   •  olopatadine (PATANOL) 0 1 % ophthalmic solution, Administer 1 drop to both eyes 2 (two) times a day, Disp: 5 mL, Rfl: 2    Allergies   Allergen Reactions   • Penicillin G Abdominal Pain, Allergic Rhinitis, Anaphylaxis, Angioedema, Anxiety, Arthralgia, Blisters, Bradycardia, Chest Pain, Confusion, Cough, Delirium, Dermatitis, Diarrhea, Dizziness, Drowsiness, Edema, Eye Swelling, Facial Swelling, Fatigue, Fever, GI Bleeding, GI Intolerance, Hallucinations, Headache, Hemorrhagic stroke, Hives, Hyperactivity, Hypertension, Irritability, Itching, Lightheadedness, Lip Swelling, Myalgia, Nasal Congestion, Nausea Only, Other (See Comments), Palpitations, Photosensitivity, Rash, Seizures, Shortness Of Breath, Sneezing, Swelling, Syncope, Tachycardia, Throat Swelling, Tinnitus, Tongue Swelling, Tremor, Visual Disturbance, Vomiting and Wheezing   • Penicillins      Reaction Date: [id number]; Unknown- infant allergy    • Pollen Extract          Social History   Social History     Substance and Sexual Activity   Alcohol Use Never     Social History     Substance and Sexual Activity   Drug Use Never     Social History     Tobacco Use   Smoking Status Never   Smokeless Tobacco Never         Family History:   Family History   Problem Relation Age of Onset   • Heart disease Mother    • Diabetes Mother    • Hypertension Mother    • Hyperlipidemia Mother    • Kidney failure Mother    • Heart disease Father    • Diabetes Father    • Hyperlipidemia Father    • Diabetes Sister    • Diabetes Sister    • Diabetes Sister          Objective     Current Vitals:      No intake or output data in the 24 hours ending 02/24/23 0843    Physical Exam:  General:  Resting comfortably in bed   Eyes:Sclera anicteric  ENT: Trachea midline  Pulm:  Symmetric chest raise  No respiratory Distress  CV:  Regular on monitor  Abdomen:  Soft NT ND  Extremities:  No clubbing/ cyanosis/ edema    Lab Results: I have personally reviewed pertinent lab results  Imaging: I have personally reviewed pertinent reports  ASSESSMENT:  Brittney Fortune is a 56 y o  male who presents for outpatient colonoscopy  PLAN:  For colonoscopy    Risks/ Benefits reviewed to include but not limited to anesthesia, bleeding, missed lesions, and colonoscopic perforation requiring surgery

## 2023-02-24 NOTE — ANESTHESIA PREPROCEDURE EVALUATION
Procedure:  COLONOSCOPY    STRESS ECHO (04/22/21):  STRESS RESULTS:  Duration of exercise was 10 min  Maximal work rate was 11 7 METs  Maximal heart rate during stress was 164 bpm ( 93 % of maximal predicted heart rate)  Target heart rate was achieved  There was no chest pain during stress      ECG CONCLUSIONS:  The stress ECG was negative for ischemia      BASELINE:  There were no regional wall motion abnormalities  Estimated left ventricular ejection fraction was 60 %       PEAK STRESS:  There were no regional wall motion abnormalities      ECHO CONCLUSIONS:  There was no echocardiographic evidence for stress-induced ischemia      Relevant Problems   ANESTHESIA (within normal limits)      CARDIO   (+) Hypertriglyceridemia      ENDO (within normal limits)      GI/HEPATIC (within normal limits)      /RENAL (within normal limits)      GYN (within normal limits)      HEMATOLOGY (within normal limits)      MUSCULOSKELETAL   (+) Chronic right-sided low back pain without sciatica      NEURO/PSYCH   (+) Chronic right-sided low back pain without sciatica      PULMONARY (within normal limits)      Lab Results   Component Value Date    WBC 5 89 05/05/2022    HGB 14 2 05/05/2022    HCT 43 8 05/05/2022    MCV 86 05/05/2022     05/05/2022     Lab Results   Component Value Date    SODIUM 139 10/18/2022    K 3 9 10/18/2022     10/18/2022    CO2 29 10/18/2022    AGAP 6 10/18/2022    BUN 10 10/18/2022    CREATININE 0 98 10/18/2022    GLUF 90 10/18/2022    CALCIUM 9 6 10/18/2022    AST 22 10/18/2022    ALT 27 10/18/2022    ALKPHOS 45 10/18/2022    TP 6 8 10/18/2022    TBILI 1 61 (H) 10/18/2022    EGFR 92 10/18/2022     No results found for: PTT  No results found for: INR, PROTIME    Physical Exam    Airway    Mallampati score: II  TM Distance: >3 FB  Neck ROM: full     Dental   No notable dental hx     Cardiovascular  Rhythm: regular, Rate: normal, Cardiovascular exam normal    Pulmonary  Pulmonary exam normal Breath sounds clear to auscultation,     Other Findings        Anesthesia Plan  ASA Score- 2     Anesthesia Type- IV sedation with anesthesia with ASA Monitors  Additional Monitors:   Airway Plan:           Plan Factors-Exercise tolerance (METS): >4 METS  Chart reviewed  EKG reviewed  Imaging results reviewed  Existing labs reviewed  Patient summary reviewed  Patient is not a current smoker  Patient did not smoke on day of surgery  Obstructive sleep apnea risk education given perioperatively  Induction- intravenous  Postoperative Plan-     Informed Consent- Anesthetic plan and risks discussed with patient

## 2023-05-25 ENCOUNTER — OFFICE VISIT (OUTPATIENT)
Dept: DERMATOLOGY | Facility: CLINIC | Age: 48
End: 2023-05-25

## 2023-05-25 VITALS — TEMPERATURE: 97.9 F | WEIGHT: 193 LBS | BODY MASS INDEX: 27.02 KG/M2 | HEIGHT: 71 IN

## 2023-05-25 DIAGNOSIS — L82.1 SEBORRHEIC KERATOSIS: Primary | ICD-10-CM

## 2023-05-25 DIAGNOSIS — L81.4 SOLAR LENTIGO: ICD-10-CM

## 2023-05-25 NOTE — PROGRESS NOTES
"[facility] Note     Patient Name: Haydee Roman  Encounter Date: 5/25/2023     Have you been cared for by a [facility] in the last 3 years and, if so, which description applies to you? Yes  I have been here within the last 3 years, and my medical history has NOT changed since that time  I am MALE/not capable of bearing children  REVIEW OF SYSTEMS:  Have you recently had or currently have any of the following? · No changes in my recent health  PAST MEDICAL HISTORY:  Have you personally ever had or currently have any of the following? If \"YES,\" then please provide more detail  · No changes in my medical history  FAMILY HISTORY:  Any \"first degree relatives\" (parent, brother, sister, or child) with the following? • No changes in my family's known health  PATIENT EXPERIENCE:    • Do you want the Dermatologist to perform a COMPLETE skin exam today including a clinical examination under the \"bra and underwear\" areas? NO  • If necessary, do we have your permission to call and leave a detailed message on your Preferred Phone number that includes your specific medical information? Yes      Allergies   Allergen Reactions   • Penicillin G Abdominal Pain, Allergic Rhinitis, Anaphylaxis, Angioedema, Anxiety, Arthralgia, Blisters, Bradycardia, Chest Pain, Confusion, Cough, Delirium, Dermatitis, Diarrhea, Dizziness, Drowsiness, Edema, Eye Swelling, Facial Swelling, Fatigue, Fever, GI Bleeding, GI Intolerance, Hallucinations, Headache, Hemorrhagic stroke, Hives, Hyperactivity, Hypertension, Irritability, Itching, Lightheadedness, Lip Swelling, Myalgia, Nasal Congestion, Nausea Only, Other (See Comments), Palpitations, Photosensitivity, Rash, Seizures, Shortness Of Breath, Sneezing, Swelling, Syncope, Tachycardia, Throat Swelling, Tinnitus, Tongue Swelling, Tremor, Visual Disturbance, Vomiting and Wheezing   • Penicillins      Reaction Date: [id number];   Unknown- infant allergy  " • Pollen Extract       Current Outpatient Medications:   •  cetirizine (ZyrTEC) 10 mg tablet, Take 1 tablet (10 mg total) by mouth daily, Disp: 90 tablet, Rfl: 1  •  mometasone (NASONEX) 50 mcg/act nasal spray, 2 sprays into each nostril daily, Disp: 1 Act, Rfl: 5  •  Multiple Vitamin (MULTIVITAMIN ADULT PO), Take by mouth, Disp: , Rfl:   •  olopatadine (PATANOL) 0 1 % ophthalmic solution, Administer 1 drop to both eyes 2 (two) times a day, Disp: 5 mL, Rfl: 2          • Whom besides the patient is providing clinical information about today's encounter?   o NO ADDITIONAL HISTORIAN (patient alone provided history)    Physical Exam and Assessment/Plan by Diagnosis:    1  SEBORRHEIC KERATOSIS/solar lentigines    Physical Exam:  • Anatomic Location Affected:  Left scalp and back  • Morphological Description:  Tan macules          G    Assessment and Plan:  Based on a thorough discussion of this condition and the management approach to it (including a comprehensive discussion of the known risks, side effects and potential benefits of treatment), the patient (family) agrees to implement the following specific plan:  • Observe for changes  • Recheck in 6 months, per patient's request     Scribe Attestation    I,:  Franklyn Fenton MA am acting as a scribe while in the presence of the attending physician :       I,:  Harvey Tejeda MD personally performed the services described in this documentation    as scribed in my presence :

## 2023-05-25 NOTE — PATIENT INSTRUCTIONS
1  SEBORRHEIC KERATOSIS    Physical Exam:  Anatomic Location Affected:  Left scalp and back  Morphological Description:  Tan macule      Assessment and Plan:  Based on a thorough discussion of this condition and the management approach to it (including a comprehensive discussion of the known risks, side effects and potential benefits of treatment), the patient (family) agrees to implement the following specific plan:  Observe for changes

## 2023-06-07 ENCOUNTER — APPOINTMENT (OUTPATIENT)
Dept: LAB | Facility: CLINIC | Age: 48
End: 2023-06-07
Payer: COMMERCIAL

## 2023-06-07 DIAGNOSIS — E78.1 HYPERTRIGLYCERIDEMIA: ICD-10-CM

## 2023-06-07 LAB
ALBUMIN SERPL BCP-MCNC: 4.3 G/DL (ref 3.5–5)
ALP SERPL-CCNC: 53 U/L (ref 34–104)
ALT SERPL W P-5'-P-CCNC: 24 U/L (ref 7–52)
ANION GAP SERPL CALCULATED.3IONS-SCNC: 5 MMOL/L (ref 4–13)
AST SERPL W P-5'-P-CCNC: 18 U/L (ref 13–39)
BASOPHILS # BLD AUTO: 0.02 THOUSANDS/ÂΜL (ref 0–0.1)
BASOPHILS NFR BLD AUTO: 0 % (ref 0–1)
BILIRUB SERPL-MCNC: 1.38 MG/DL (ref 0.2–1)
BUN SERPL-MCNC: 15 MG/DL (ref 5–25)
CALCIUM SERPL-MCNC: 9.2 MG/DL (ref 8.4–10.2)
CHLORIDE SERPL-SCNC: 103 MMOL/L (ref 96–108)
CHOLEST SERPL-MCNC: 217 MG/DL
CO2 SERPL-SCNC: 30 MMOL/L (ref 21–32)
CREAT SERPL-MCNC: 0.93 MG/DL (ref 0.6–1.3)
EOSINOPHIL # BLD AUTO: 0.11 THOUSAND/ÂΜL (ref 0–0.61)
EOSINOPHIL NFR BLD AUTO: 2 % (ref 0–6)
ERYTHROCYTE [DISTWIDTH] IN BLOOD BY AUTOMATED COUNT: 13.5 % (ref 11.6–15.1)
GFR SERPL CREATININE-BSD FRML MDRD: 98 ML/MIN/1.73SQ M
GLUCOSE P FAST SERPL-MCNC: 102 MG/DL (ref 65–99)
HCT VFR BLD AUTO: 41.9 % (ref 36.5–49.3)
HDLC SERPL-MCNC: 41 MG/DL
HGB BLD-MCNC: 13.6 G/DL (ref 12–17)
IMM GRANULOCYTES # BLD AUTO: 0.04 THOUSAND/UL (ref 0–0.2)
IMM GRANULOCYTES NFR BLD AUTO: 1 % (ref 0–2)
LDLC SERPL CALC-MCNC: 124 MG/DL (ref 0–100)
LYMPHOCYTES # BLD AUTO: 1.27 THOUSANDS/ÂΜL (ref 0.6–4.47)
LYMPHOCYTES NFR BLD AUTO: 26 % (ref 14–44)
MCH RBC QN AUTO: 29.1 PG (ref 26.8–34.3)
MCHC RBC AUTO-ENTMCNC: 32.5 G/DL (ref 31.4–37.4)
MCV RBC AUTO: 90 FL (ref 82–98)
MONOCYTES # BLD AUTO: 0.55 THOUSAND/ÂΜL (ref 0.17–1.22)
MONOCYTES NFR BLD AUTO: 11 % (ref 4–12)
NEUTROPHILS # BLD AUTO: 2.99 THOUSANDS/ÂΜL (ref 1.85–7.62)
NEUTS SEG NFR BLD AUTO: 60 % (ref 43–75)
NONHDLC SERPL-MCNC: 176 MG/DL
NRBC BLD AUTO-RTO: 0 /100 WBCS
PLATELET # BLD AUTO: 144 THOUSANDS/UL (ref 149–390)
PMV BLD AUTO: 12.9 FL (ref 8.9–12.7)
POTASSIUM SERPL-SCNC: 4.1 MMOL/L (ref 3.5–5.3)
PROT SERPL-MCNC: 7 G/DL (ref 6.4–8.4)
RBC # BLD AUTO: 4.68 MILLION/UL (ref 3.88–5.62)
SODIUM SERPL-SCNC: 138 MMOL/L (ref 135–147)
TRIGL SERPL-MCNC: 259 MG/DL
WBC # BLD AUTO: 4.98 THOUSAND/UL (ref 4.31–10.16)

## 2023-06-07 PROCEDURE — 80053 COMPREHEN METABOLIC PANEL: CPT

## 2023-06-07 PROCEDURE — 85025 COMPLETE CBC W/AUTO DIFF WBC: CPT

## 2023-06-07 PROCEDURE — 80061 LIPID PANEL: CPT

## 2023-06-07 PROCEDURE — 36415 COLL VENOUS BLD VENIPUNCTURE: CPT

## 2023-06-13 ENCOUNTER — OFFICE VISIT (OUTPATIENT)
Dept: FAMILY MEDICINE CLINIC | Facility: CLINIC | Age: 48
End: 2023-06-13
Payer: COMMERCIAL

## 2023-06-13 VITALS
SYSTOLIC BLOOD PRESSURE: 110 MMHG | RESPIRATION RATE: 16 BRPM | HEIGHT: 71 IN | BODY MASS INDEX: 27.86 KG/M2 | OXYGEN SATURATION: 97 % | WEIGHT: 199 LBS | DIASTOLIC BLOOD PRESSURE: 70 MMHG | HEART RATE: 80 BPM

## 2023-06-13 DIAGNOSIS — J30.1 SEASONAL ALLERGIC RHINITIS DUE TO POLLEN: ICD-10-CM

## 2023-06-13 DIAGNOSIS — E78.2 MIXED HYPERLIPIDEMIA: ICD-10-CM

## 2023-06-13 DIAGNOSIS — D69.6 LOW PLATELET COUNT (HCC): ICD-10-CM

## 2023-06-13 DIAGNOSIS — R73.01 IMPAIRED FASTING BLOOD SUGAR: ICD-10-CM

## 2023-06-13 DIAGNOSIS — E78.1 HYPERTRIGLYCERIDEMIA: Primary | ICD-10-CM

## 2023-06-13 PROBLEM — Z11.59 NEED FOR HEPATITIS C SCREENING TEST: Status: RESOLVED | Noted: 2019-08-20 | Resolved: 2023-06-13

## 2023-06-13 PROCEDURE — 99214 OFFICE O/P EST MOD 30 MIN: CPT | Performed by: FAMILY MEDICINE

## 2023-06-13 NOTE — ASSESSMENT & PLAN NOTE
Discussed with him about eating healthy and low-fat diet, and cutting down carbs in his diet, he says in the past he was doing more vegetarian diet and he controlled his cholesterol and he is planning to do again will start the exercise

## 2023-06-13 NOTE — ASSESSMENT & PLAN NOTE
He has elevated fasting blood sugar, discussed with him about low-carb diet exercise and will recheck labs in 6 months

## 2023-06-13 NOTE — PROGRESS NOTES
Name: Tacho Stringer      : 1975      MRN: [id number]  Encounter Provider: Calin Mccullough MD  Encounter Date: 2023   Encounter department: [facility]     1  Hypertriglyceridemia  Assessment & Plan:  Discussed with him about eating healthy and low-fat diet, and cutting down carbs in his diet, he says in the past he was doing more vegetarian diet and he controlled his cholesterol and he is planning to do again will start the exercise    Orders:  -     Lipid panel; Future; Expected date: 2023    2  Impaired fasting blood sugar  Assessment & Plan:  He has elevated fasting blood sugar, discussed with him about low-carb diet exercise and will recheck labs in 6 months    Orders:  -     Comprehensive metabolic panel; Future; Expected date: 2023  -     Hemoglobin A1C; Future; Expected date: 2023    3  Mixed hyperlipidemia  -     Lipid panel; Future; Expected date: 2023    4  Low platelet count (HCC)  Assessment & Plan:  Slightly low platelet, will repeat in 6-month    Orders:  -     CBC and differential; Future; Expected date: 2023    5  Seasonal allergic rhinitis due to pollen  Assessment & Plan:  Know he takes natural medicine and allergies are under control             Subjective     He is here for follow-up on his labs, he has hypertriglyceridemia and hyperlipidemia and he tries to control with diet and exercise, recently he says he had some change in his diet and lack of exercise which increase his cholesterol and blood sugar  His allergies are under control with some natural medications, overall he feels well  He has still a patch of sensitive skin on the medial part of the right thigh he had the ultrasound and was seen by orthopedic now is sent to the neurologist, he feels pain type of feeling there  Review of Systems   Constitutional: Negative  HENT: Negative  Eyes: Negative  Respiratory: Negative      Cardiovascular: Negative  Musculoskeletal: Negative  Skin: Negative  Neurological:        Still feels sensitive skin on the medial part of the right thigh and is planning to see the neurologist       Past Medical History:   Diagnosis Date   • Allergic rhinitis     Onset: 4/28/2008   • Chronic lower back pain 12/29/2016   • 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/2012   • Dermatitis 3/5/2018   • Diabetes ([address] )     Onset: 4/14/2009   • Golfers elbow, right 8/20/2019   • Hordeolum externum unspecified eye, unspecified eyelid     Onset: 6/30/2009   • IBS (irritable bowel syndrome)     Onset: 5/23/2008   • Impaired fasting glucose     Onset: 4/28/2009   • Plantar wart, left foot     Last Assessed: 12/29/2016   • Plantar wart, right foot 8/20/2019   • 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   • Rosacea     Onset: 3/21/2006   • Viral wart on finger 8/20/2019     History reviewed  No pertinent surgical history    Family History   Problem Relation Age of Onset   • Heart disease Mother    • Diabetes Mother    • Hypertension Mother    • Hyperlipidemia Mother    • Kidney failure Mother    • Heart disease Father    • Diabetes Father    • Hyperlipidemia Father    • Diabetes Sister    • Diabetes Sister    • Diabetes Sister      Social History     Socioeconomic History   • Marital status: /Civil Union     Spouse name: None   • Number of children: 2   • Years of education: None   • Highest education level: None   Occupational History   • None   Tobacco Use   • Smoking status: Never   • Smokeless tobacco: Never   Vaping Use   • Vaping Use: Never used   Substance and Sexual Activity   • Alcohol use: Never   • Drug use: Never   • Sexual activity: None   Other Topics Concern   • None   Social History Narrative    Caffeine use     Social Determinants of Health     Financial Resource Strain: Not on file   Food Insecurity: Not on file   Transportation Needs: Not on file   Physical Activity: Not on file   Stress: Not on file   Social Connections: Not on file   Intimate Partner Violence: Not on file   Housing Stability: Not on file     Current Outpatient Medications on File Prior to Visit   Medication Sig   • Multiple Vitamin (MULTIVITAMIN ADULT PO) Take by mouth   • [DISCONTINUED] cetirizine (ZyrTEC) 10 mg tablet Take 1 tablet (10 mg total) by mouth daily (Patient not taking: Reported on 6/13/2023)   • [DISCONTINUED] mometasone (NASONEX) 50 mcg/act nasal spray 2 sprays into each nostril daily (Patient not taking: Reported on 6/13/2023)   • [DISCONTINUED] olopatadine (PATANOL) 0 1 % ophthalmic solution Administer 1 drop to both eyes 2 (two) times a day (Patient not taking: Reported on 6/13/2023)     Allergies   Allergen Reactions   • Penicillin G Abdominal Pain, Allergic Rhinitis, Anaphylaxis, Angioedema, Anxiety, Arthralgia, Blisters, Bradycardia, Chest Pain, Confusion, Cough, Delirium, Dermatitis, Diarrhea, Dizziness, Drowsiness, Edema, Eye Swelling, Facial Swelling, Fatigue, Fever, GI Bleeding, GI Intolerance, Hallucinations, Headache, Hemorrhagic stroke, Hives, Hyperactivity, Hypertension, Irritability, Itching, Lightheadedness, Lip Swelling, Myalgia, Nasal Congestion, Nausea Only, Other (See Comments), Palpitations, Photosensitivity, Rash, Seizures, Shortness Of Breath, Sneezing, Swelling, Syncope, Tachycardia, Throat Swelling, Tinnitus, Tongue Swelling, Tremor, Visual Disturbance, Vomiting and Wheezing   • Penicillins      Reaction Date: [id number];   Unknown- infant allergy    • Pollen Extract      Immunization History   Administered Date(s) Administered   • COVID-19 PFIZER VACCINE 0 3 ML IM 03/23/2021, 04/14/2021, 12/07/2021   • INFLUENZA 10/09/2015, 12/02/2016, 10/06/2017, 10/01/2018   • Influenza Quadrivalent, 6-35 Months IM 10/09/2015, 12/02/2016, 10/06/2017   • Influenza, injectable, quadrivalent, preservative free 0 5 mL 11/28/2022   • Influenza, seasonal, injectable 10/28/2008, 09/25/2009   • Td (adult), adsorbed 01/28/2011   • Tdap "04/29/2021       Objective     /70   Pulse 80   Resp 16   Ht 5' 11\" (1 803 m)   Wt 90 3 kg (199 lb)   SpO2 97%   BMI 27 75 kg/m²     Physical Exam  Vitals and nursing note reviewed  Constitutional:       Appearance: Normal appearance  He is well-developed  He is not ill-appearing  Eyes:      Extraocular Movements: Extraocular movements intact  Neck:      Thyroid: No thyromegaly  Cardiovascular:      Rate and Rhythm: Normal rate and regular rhythm  Heart sounds: Normal heart sounds  No murmur heard  Pulmonary:      Effort: Pulmonary effort is normal       Breath sounds: Normal breath sounds  No wheezing or rales  Musculoskeletal:      Cervical back: Normal range of motion and neck supple  Right lower leg: No edema  Left lower leg: No edema  Skin:     Findings: No erythema or rash  Neurological:      Mental Status: He is alert and oriented to person, place, and time     Psychiatric:         Mood and Affect: Mood normal        Preet Lentz MD  "

## 2023-11-28 ENCOUNTER — OFFICE VISIT (OUTPATIENT)
Dept: DERMATOLOGY | Facility: CLINIC | Age: 48
End: 2023-11-28
Payer: COMMERCIAL

## 2023-11-28 VITALS — BODY MASS INDEX: 28 KG/M2 | HEIGHT: 71 IN | WEIGHT: 200 LBS | TEMPERATURE: 98 F

## 2023-11-28 DIAGNOSIS — D18.01 CHERRY ANGIOMA: ICD-10-CM

## 2023-11-28 DIAGNOSIS — L81.4 LENTIGINES: ICD-10-CM

## 2023-11-28 DIAGNOSIS — D22.9 MULTIPLE BENIGN MELANOCYTIC NEVI: ICD-10-CM

## 2023-11-28 DIAGNOSIS — L82.1 SEBORRHEIC KERATOSIS: Primary | ICD-10-CM

## 2023-11-28 PROCEDURE — 99213 OFFICE O/P EST LOW 20 MIN: CPT | Performed by: DERMATOLOGY

## 2023-11-28 NOTE — PATIENT INSTRUCTIONS
SEBORRHEIC KERATOSES    - Relevant exam: Scattered over the trunk/extremities are waxy brown to black plaques and papules with stuck on appearance and consistent dermoscopy  - Exam and clinical history consistent with seborrheic keratoses  - Counseled that these are benign growths that do not require treatment  - Counseled that removal of lesions is considered cosmetic and so would incur a fee should patient elect to move forward.   -left scalp (0.6 x 0.5 cm) has been monitored every 6 months - no changes present on exam       MELANOCYTIC NEVI  -Relevant exam: Scattered over the trunk/extremities are homogenously pigmented brown macules and papules. ELM performed and without concerning findings. No outliers unless otherwise noted in today's note  - Exam and clinical history consistent with melanocytic nevi  - Educated on the ABCDE's of melanoma; handout provided  - Counseled to return to clinic prior to scheduled appointment should any of these lesions change or should any new lesions of concern arise  - Counseled on use of sun protection daily. Reviewed latest FDA sunscreen guidelines, including use of broad spectrum (UVA and UVB blocking) sunscreen or sun protective clothing with SPF 30-50 every 2-3 hours and reapplied after exposure to water; use of photoprotective clothing, including a broad brim hat and UPF rated clothing if outdoors for several hours; avoid use of tanning beds as these pose significant risk for melanoma and skin cancer. LENTIGINES  OTHER SKIN CHANGES DUE TO CHRONIC EXPOSURE TO NONIONIZING RADIATION  - Relevant exam: Over sun exposed areas are brown macules. ELM performed and without concerning findings. - Exam and clinical history consistent with lentigines. - Educated that these are indicative of prior sun exposure.    - Counseled to return to clinic prior to scheduled appointment should any of these lesions change or should any new lesions of concern arise.  - Recommended use of sunscreen as above and below. - Counseled on use of sun protection daily. Reviewed latest FDA sunscreen guidelines, including use of broad spectrum (UVA and UVB blocking) sunscreen or sun protective clothing with SPF 30-50 every 2-3 hours and reapplied after exposure to water; use of photoprotective clothing, including a broad brim hat and UPF rated clothing if outdoors for several hours; avoid use of tanning beds as these pose significant risk for melanoma and skin cancer. CHERRY ANGIOMAS  - Relevant exam: Scattered over the trunk/extremities are red papules  - Exam and clinical history consistent with cherry angiomas  - Educated that these are benign  - Educated that removal is considered aesthetic and would incur a fee.

## 2023-11-28 NOTE — PROGRESS NOTES
[facility] Note     Patient Name: Clarisse Fonseca  Encounter Date: 11/28/2023     Have you been cared for by a [facility] in the last 3 years and, if so, which description applies to you? Yes. I have been here within the last 3 years, and my medical history has NOT changed since that time. I am MALE/not capable of bearing children. REVIEW OF SYSTEMS:  Have you recently had or currently have any of the following? No changes in my recent health. PAST MEDICAL HISTORY:  Have you personally ever had or currently have any of the following? If "YES," then please provide more detail. No changes in my medical history. HISTORY OF IMMUNOSUPPRESSION: Do you have a history of any of the following:  Systemic Immunosuppression such as Diabetes, Biologic or Immunotherapy, Chemotherapy, Organ Transplantation, Bone Marrow Transplantation? No     Answering "YES" requires the addition of the dotphrase "IMMUNOSUPPRESSED" as the first diagnosis of the patient's visit. FAMILY HISTORY:  Any "first degree relatives" (parent, brother, sister, or child) with the following? No changes in my family's known health. PATIENT EXPERIENCE:    Do you want the Dermatologist to perform a COMPLETE skin exam today including a clinical examination under the "bra and underwear" areas? Yes  If necessary, do we have your permission to call and leave a detailed message on your Preferred Phone number that includes your specific medical information?   Yes      Allergies   Allergen Reactions    Penicillin G Abdominal Pain, Allergic Rhinitis, Anaphylaxis, Angioedema, Anxiety, Arthralgia, Blisters, Bradycardia, Chest Pain, Confusion, Cough, Delirium, Dermatitis, Diarrhea, Dizziness, Drowsiness, Edema, Eye Swelling, Facial Swelling, Fatigue, Fever, GI Bleeding, GI Intolerance, Hallucinations, Headache, Hemorrhagic stroke, Hives, Hyperactivity, Hypertension, Irritability, Itching, Lightheadedness, Lip Swelling, Myalgia, Nasal Congestion, Nausea Only, Other (See Comments), Palpitations, Photosensitivity, Rash, Seizures, Shortness Of Breath, Sneezing, Swelling, Syncope, Tachycardia, Throat Swelling, Tinnitus, Tongue Swelling, Tremor, Visual Disturbance, Vomiting and Wheezing    Penicillins      Reaction Date: [id number]; Unknown- infant allergy     Pollen Extract       Current Outpatient Medications:     Multiple Vitamin (MULTIVITAMIN ADULT PO), Take by mouth, Disp: , Rfl:           Whom besides the patient is providing clinical information about today's encounter? NO ADDITIONAL HISTORIAN (patient alone provided history)    Physical Exam and Assessment/Plan by Diagnosis:  NEOPLASM OF UNCERTAIN BEHAVIOR OF SKIN    Physical Exam:  (Anatomic Location); (Size and Morphological Description); (Differential Diagnosis):  Left temporal hairline; 0.6 x 0.5 cm irregularly pigmented and bordered thin papule with focal lack of pigment network and focal vessels; differential diagnosis: nevus vs atypical nevus; rule out melanoma  Pertinent Positives:  Pertinent Negatives: Additional History of Present Condition:  Lesion present for roughly 2 years. Doubled in size previously and was being monitored by Dr Maik Dee and diagnosed as SK. Assessment and Plan: Very concerning clinical appearance and not c/w SK. Recommend biopsy ASAP. Patient declined today as he was not prepared for biopsy but committed to return on DEC 12 for shave biopsy to rule out melanoma. I have discussed with the patient that a sample of skin via a "skin biopsy” would be potentially helpful to further make a specific diagnosis under the microscope. Based on a thorough discussion of this condition and the management approach to it (including a comprehensive discussion of the known risks, side effects and potential benefits of treatment), the patient (family) agrees to implement the following specific plan:    Procedure:  Skin Biopsy.   Scheduled DEC [address]    - Relevant exam: Scattered over the trunk/extremities are waxy brown to black plaques and papules with stuck on appearance and consistent dermoscopy  - Exam and clinical history consistent with seborrheic keratoses  - Counseled that these are benign growths that do not require treatment  - Counseled that removal of lesions is considered cosmetic and so would incur a fee should patient elect to move forward. MELANOCYTIC NEVI  -Relevant exam: Scattered over the trunk/extremities are homogenously pigmented brown macules and papules. ELM performed and without concerning findings. No outliers unless otherwise noted in today's note  - Exam and clinical history consistent with melanocytic nevi  - Educated on the ABCDE's of melanoma; handout provided  - Counseled to return to clinic prior to scheduled appointment should any of these lesions change or should any new lesions of concern arise  - Counseled on use of sun protection daily. Reviewed latest FDA sunscreen guidelines, including use of broad spectrum (UVA and UVB blocking) sunscreen or sun protective clothing with SPF 30-50 every 2-3 hours and reapplied after exposure to water; use of photoprotective clothing, including a broad brim hat and UPF rated clothing if outdoors for several hours; avoid use of tanning beds as these pose significant risk for melanoma and skin cancer. LENTIGINES  OTHER SKIN CHANGES DUE TO CHRONIC EXPOSURE TO NONIONIZING RADIATION  - Relevant exam: Over sun exposed areas are brown macules. ELM performed and without concerning findings. - Exam and clinical history consistent with lentigines. - Educated that these are indicative of prior sun exposure. - Counseled to return to clinic prior to scheduled appointment should any of these lesions change or should any new lesions of concern arise.  - Recommended use of sunscreen as above and below. - Counseled on use of sun protection daily.  Reviewed latest FDA sunscreen guidelines, including use of broad spectrum (UVA and UVB blocking) sunscreen or sun protective clothing with SPF 30-50 every 2-3 hours and reapplied after exposure to water; use of photoprotective clothing, including a broad brim hat and UPF rated clothing if outdoors for several hours; avoid use of tanning beds as these pose significant risk for melanoma and skin cancer. CHERRY ANGIOMAS  - Relevant exam: Scattered over the trunk/extremities are red papules  - Exam and clinical history consistent with cherry angiomas  - Educated that these are benign  - Educated that removal is considered aesthetic and would incur a fee.       Scribe Attestation      I,:  Helena Saenz MA am acting as a scribe while in the presence of the attending physician.:       I,:  Jacey Garvey MD personally performed the services described in this documentation    as scribed in my presence.:

## 2023-12-12 ENCOUNTER — OFFICE VISIT (OUTPATIENT)
Dept: DERMATOLOGY | Facility: CLINIC | Age: 48
End: 2023-12-12
Payer: COMMERCIAL

## 2023-12-12 VITALS — WEIGHT: 194 LBS | TEMPERATURE: 97.9 F | HEIGHT: 71 IN | BODY MASS INDEX: 27.16 KG/M2

## 2023-12-12 DIAGNOSIS — D48.5 NEOPLASM OF UNCERTAIN BEHAVIOR OF SKIN: Primary | ICD-10-CM

## 2023-12-12 DIAGNOSIS — C44.41 BASAL CELL CARCINOMA (BCC) OF SCALP: ICD-10-CM

## 2023-12-12 PROCEDURE — 88305 TISSUE EXAM BY PATHOLOGIST: CPT | Performed by: DERMATOLOGY

## 2023-12-12 PROCEDURE — 11102 TANGNTL BX SKIN SINGLE LES: CPT | Performed by: DERMATOLOGY

## 2023-12-12 NOTE — PATIENT INSTRUCTIONS
INFORMED CONSENT DISCUSSION AND POST-OPERATIVE INSTRUCTIONS FOR PATIENT    I.  RATIONALE FOR PROCEDURE  I understand that a skin biopsy allows the Dermatologist to test a lesion or rash under the microscope to obtain a diagnosis. It usually involves numbing the area with numbing medication and removing a small piece of skin; sometimes the area will be closed with sutures. In this specific procedure, sutures are not usually needed. If any sutures are placed, then they are usually need to be removed in 2 weeks or less. I understand that my Dermatologist recommends that a skin "shave" biopsy be performed today. A local anesthetic, similar to the kind that a dentist uses when filling a cavity, will be injected with a very small needle into the skin area to be sampled. The injected skin and tissue underneath "will go to sleep” and become numb so no pain should be felt afterwards. An instrument shaped like a tiny "razor blade" (shave biopsy instrument) will be used to cut a small piece of tissue and skin from the area so that a sample of tissue can be taken and examined more closely under the microscope. A slight amount of bleeding will occur, but it will be stopped with direct pressure and a pressure bandage and any other appropriate methods. I understands that a scar will form where the wound was created. Surgical ointment will be applied to help protect the wound. Sutures are not usually needed.     II.  RISKS AND POTENTIAL COMPLICATIONS   I understand the risks and potential complications of a skin biopsy include but are not limited to the following:  Bleeding  Infection  Pain  Scar/keloid  Skin discoloration  Incomplete Removal  Recurrence  Nerve Damage/Numbness/Loss of Function  Allergic Reaction to Anesthesia  Biopsies are diagnostic procedures and based on findings additional treatment or evaluation may be required  Loss or destruction of specimen resulting in no additional findings    My Dermatologist has explained to me the nature of the condition, the nature of the procedure, and the benefits to be reasonably expected compared with alternative approaches. My Dermatologist has discussed the likelihood of major risks or complications of this procedure including the specific risks listed above, such as bleeding, infection, and scarring/keloid. I understand that a scar is expected after this procedure. I understand that my physician cannot predict if the scar will form a "keloid," which extends beyond the borders of the wound that is created. A keloid is a thick, painful, and bumpy scar. A keloid can be difficult to treat, as it does not always respond well to therapy, which includes injecting cortisone directly into the keloid every few weeks. While this usually reduces the pain and size of the scar, it does not eliminate it. I understand that photographs may be taken before and after the procedure. These will be maintained as part of the medical providers confidential records and may not be made available to me. I further authorize the medical provider to use the photographs for teaching purposes or to illustrate scientific papers, books, or lectures if in his/her judgment, medical research, education, or science may benefit from its use. I have had an opportunity to fully inquire about the risks and benefits of this procedure and its alternatives. I have been given ample time and opportunity to ask questions and to seek a second opinion if I wished to do so. I acknowledge that there have specifically been no guarantees as to the cosmetic results from the procedure. I am aware that with any procedure there is always the possibility of an unexpected complication. III. POST-PROCEDURAL CARE (WHAT YOU WILL NEED TO DO "AFTER THE BIOPSY" TO OPTIMIZE HEALING)    Keep the area clean and dry. Try NOT to remove the bandage or get it wet for the first 24 hours.     Gently clean the area and apply surgical ointment (such as Vaseline petrolatum ointment, which is available "over the counter" and not a prescription) to the biopsy site for up to 2 weeks straight. This acts to protect the wound from the outside world. Sutures are not usually placed in this procedure. If any sutures were placed, return for suture removal as instructed (generally 1 week for the face, 2 weeks for the body). Take Acetaminophen (Tylenol) for discomfort, if no contraindications. Ibuprofen or aspirin could make bleeding worse. Call our office immediately for signs of infection: fever, chills, increased redness, warmth, tenderness, discomfort/pain, or pus or foul smell coming from the wound. WHAT TO DO IF THERE IS ANY BLEEDING? If a small amount of bleeding is noticed, place a clean cloth over the area and apply firm pressure for ten minutes. Check the wound after 10 minutes of direct pressure. If bleeding persists, try one more time for an additional 10 minutes of direct pressure on the area. If the bleeding becomes heavier or does not stop after the second attempt, or if you have any other questions about this procedure, then please call your [facility] by calling [phone] (SKIN). I hereby acknowledge that I have reviewed and verified the site with my Dermatologist and have requested and authorized my Dermatologist to proceed with the procedure.

## 2023-12-12 NOTE — PROGRESS NOTES
[facility]     Patient Name: Janelle Quinones  Encounter Date: 12/12/2023     Have you been cared for by a [facility]ologist in the last 3 years and, if so, which description applies to you? Yes. I have been here within the last 3 years, and my medical history has NOT changed since that time. I am MALE/not capable of bearing children. REVIEW OF SYSTEMS:  Have you recently had or currently have any of the following? No changes in my recent health. PAST MEDICAL HISTORY:  Have you personally ever had or currently have any of the following? If "YES," then please provide more detail. No changes in my medical history. HISTORY OF IMMUNOSUPPRESSION: Do you have a history of any of the following:  Systemic Immunosuppression such as Diabetes, Biologic or Immunotherapy, Chemotherapy, Organ Transplantation, Bone Marrow Transplantation? No     Answering "YES" requires the addition of the dotphrase "IMMUNOSUPPRESSED" as the first diagnosis of the patient's visit. FAMILY HISTORY:  Any "first degree relatives" (parent, brother, sister, or child) with the following? No changes in my family's known health. PATIENT EXPERIENCE:    Do you want the Dermatologist to perform a COMPLETE skin exam today including a clinical examination under the "bra and underwear" areas? NO  If necessary, do we have your permission to call and leave a detailed message on your Preferred Phone number that includes your specific medical information?   Yes      Allergies   Allergen Reactions    Penicillin G Abdominal Pain, Allergic Rhinitis, Anaphylaxis, Angioedema, Anxiety, Arthralgia, Blisters, Bradycardia, Chest Pain, Confusion, Cough, Delirium, Dermatitis, Diarrhea, Dizziness, Drowsiness, Edema, Eye Swelling, Facial Swelling, Fatigue, Fever, GI Bleeding, GI Intolerance, Hallucinations, Headache, Hemorrhagic stroke, Hives, Hyperactivity, Hypertension, Irritability, Itching, Lightheadedness, Lip Swelling, Myalgia, Nasal Congestion, Nausea Only, Other (See Comments), Palpitations, Photosensitivity, Rash, Seizures, Shortness Of Breath, Sneezing, Swelling, Syncope, Tachycardia, Throat Swelling, Tinnitus, Tongue Swelling, Tremor, Visual Disturbance, Vomiting and Wheezing    Penicillins      Reaction Date: [id number]; Unknown- infant allergy     Pollen Extract       Current Outpatient Medications:     Multiple Vitamin (MULTIVITAMIN ADULT PO), Take by mouth, Disp: , Rfl:           Whom besides the patient is providing clinical information about today's encounter? NO ADDITIONAL HISTORIAN (patient alone provided history)    Physical Exam and Assessment/Plan by Diagnosis:    NEOPLASM OF UNCERTAIN BEHAVIOR OF SKIN    Physical Exam:  (Anatomic Location); (Size and Morphological Description); (Differential Diagnosis):  Specimen A: Left temporal hairline; 0.6 x 0.5 cm irregularly pigmented and bordered thin papule with focal lack of pigment network and focal vessels; differential diagnosis: nevus vs atypical nevus; rule out melanoma  Pertinent Positives:  Pertinent Negatives: Additional History of Present Condition:  The lesion has been presents for approximately 2 years. Double in size previously and was being monitored by Dr. Devon Flores and Diagnosed as SK. Assessment and Plan: Given change and clinical appearance, biopsy recommended. Discussed options for removal to minimize scar, but emphasized there would be a small scar with a small amount of hair lost. Patient agreed to proceed. I have discussed with the patient that a sample of skin via a "skin biopsy” would be potentially helpful to further make a specific diagnosis under the microscope.   Based on a thorough discussion of this condition and the management approach to it (including a comprehensive discussion of the known risks, side effects and potential benefits of treatment), the patient (family) agrees to implement the following specific plan:    Procedure:  Skin Biopsy. After a thorough discussion of treatment options and risk/benefits/alternatives (including but not limited to local pain, scarring, dyspigmentation, blistering, possible superinfection, and inability to confirm a diagnosis via histopathology), verbal and written consent were obtained and portion of the rash was biopsied for tissue sample. See below for consent that was obtained from patient and subsequent Procedure Note. PROCEDURE TANGENTIAL (SHAVE) BIOPSY NOTE:    Performing Physician: Cora Jimenez  Anatomic Location; Clinical Description with size (cm); Pre-Op Diagnosis:   Specimen A: Left temporal hairline; 0.6 x 0.5 cm irregularly pigmented and bordered thin papule with focal lack of pigment network and focal vessels; differential diagnosis: nevus vs atypical nevus; rule out melanoma  Post-op diagnosis: Same     Local anesthesia: 1% xylocaine with epi      Topical anesthesia: None    Hemostasis: Aluminum chloride       After obtaining informed consent  at which time there was a discussion about the purpose of biopsy  and low risks of infection and bleeding. The area was prepped and draped in the usual fashion. Anesthesia was obtained with 1% lidocaine with epinephrine. A shave biopsy to an appropriate sampling depth was obtained by Shave (Dermablade or 15 blade) The resulting wound was covered with surgical ointment and bandaged appropriately. The patient tolerated the procedure well without complications and was without signs of functional compromise. Specimen has been sent for review by Dermatopathology. Standard post-procedure care has been explained and has been included in written form within the patient's copy of Informed Consent. INFORMED CONSENT DISCUSSION AND POST-OPERATIVE INSTRUCTIONS FOR PATIENT    I.  RATIONALE FOR PROCEDURE  I understand that a skin biopsy allows the Dermatologist to test a lesion or rash under the microscope to obtain a diagnosis.   It usually involves numbing the area with numbing medication and removing a small piece of skin; sometimes the area will be closed with sutures. In this specific procedure, sutures are not usually needed. If any sutures are placed, then they are usually need to be removed in 2 weeks or less. I understand that my Dermatologist recommends that a skin "shave" biopsy be performed today. A local anesthetic, similar to the kind that a dentist uses when filling a cavity, will be injected with a very small needle into the skin area to be sampled. The injected skin and tissue underneath "will go to sleep” and become numb so no pain should be felt afterwards. An instrument shaped like a tiny "razor blade" (shave biopsy instrument) will be used to cut a small piece of tissue and skin from the area so that a sample of tissue can be taken and examined more closely under the microscope. A slight amount of bleeding will occur, but it will be stopped with direct pressure and a pressure bandage and any other appropriate methods. I understands that a scar will form where the wound was created. Surgical ointment will be applied to help protect the wound. Sutures are not usually needed. II.  RISKS AND POTENTIAL COMPLICATIONS   I understand the risks and potential complications of a skin biopsy include but are not limited to the following:  Bleeding  Infection  Pain  Scar/keloid  Skin discoloration  Incomplete Removal  Recurrence  Nerve Damage/Numbness/Loss of Function  Allergic Reaction to Anesthesia  Biopsies are diagnostic procedures and based on findings additional treatment or evaluation may be required  Loss or destruction of specimen resulting in no additional findings    My Dermatologist has explained to me the nature of the condition, the nature of the procedure, and the benefits to be reasonably expected compared with alternative approaches.   My Dermatologist has discussed the likelihood of major risks or complications of this procedure including the specific risks listed above, such as bleeding, infection, and scarring/keloid. I understand that a scar is expected after this procedure. I understand that my physician cannot predict if the scar will form a "keloid," which extends beyond the borders of the wound that is created. A keloid is a thick, painful, and bumpy scar. A keloid can be difficult to treat, as it does not always respond well to therapy, which includes injecting cortisone directly into the keloid every few weeks. While this usually reduces the pain and size of the scar, it does not eliminate it. I understand that photographs may be taken before and after the procedure. These will be maintained as part of the medical providers confidential records and may not be made available to me. I further authorize the medical provider to use the photographs for teaching purposes or to illustrate scientific papers, books, or lectures if in his/her judgment, medical research, education, or science may benefit from its use. I have had an opportunity to fully inquire about the risks and benefits of this procedure and its alternatives. I have been given ample time and opportunity to ask questions and to seek a second opinion if I wished to do so. I acknowledge that there have specifically been no guarantees as to the cosmetic results from the procedure. I am aware that with any procedure there is always the possibility of an unexpected complication. III. POST-PROCEDURAL CARE (WHAT YOU WILL NEED TO DO "AFTER THE BIOPSY" TO OPTIMIZE HEALING)    Keep the area clean and dry. Try NOT to remove the bandage or get it wet for the first 24 hours. Gently clean the area and apply surgical ointment (such as Vaseline petrolatum ointment, which is available "over the counter" and not a prescription) to the biopsy site for up to 2 weeks straight. This acts to protect the wound from the outside world.       Sutures are not usually placed in this procedure. If any sutures were placed, return for suture removal as instructed (generally 1 week for the face, 2 weeks for the body). Take Acetaminophen (Tylenol) for discomfort, if no contraindications. Ibuprofen or aspirin could make bleeding worse. Call our office immediately for signs of infection: fever, chills, increased redness, warmth, tenderness, discomfort/pain, or pus or foul smell coming from the wound. WHAT TO DO IF THERE IS ANY BLEEDING? If a small amount of bleeding is noticed, place a clean cloth over the area and apply firm pressure for ten minutes. Check the wound after 10 minutes of direct pressure. If bleeding persists, try one more time for an additional 10 minutes of direct pressure on the area. If the bleeding becomes heavier or does not stop after the second attempt, or if you have any other questions about this procedure, then please call your [facility]'s Dermatologist by calling [phone] (SKIN). I hereby acknowledge that I have reviewed and verified the site with my Dermatologist and have requested and authorized my Dermatologist to proceed with the procedure.          Scribe Attestation      I,:  Ruiz Willoughby am acting as a scribe while in the presence of the attending physician.:       I,:  Nicole Isaacs MD personally performed the services described in this documentation    as scribed in my presence.:

## 2023-12-18 PROCEDURE — 88305 TISSUE EXAM BY PATHOLOGIST: CPT | Performed by: DERMATOLOGY

## 2023-12-18 NOTE — RESULT ENCOUNTER NOTE
Team- please schedule patient for FBSE in 6 months.    DERMATOPATHOLOGY RESULT NOTE    Results reviewed by ordering physician.  Called patient to personally discuss results. Discussed results with patient.       Instructions for Clinical Derm Team:   (remember to route Result Note to appropriate staff):    Call patient and schedule for mohs, FBSE in 6 months    Result & Plan by Specimen:    Specimen A: malignant bcc  Plan: MOHs. Discussed option for topical therapy but given location and nodular component, recommend mohs.    0 Result Notes     Component   Case Report  Surgical Pathology Report                         Case: [id number]                                  Authorizing Provider:  Fide Weiner MD     Collected:           12/12/2023 1603              Ordering Location:     [facility]      Received:            12/12/2023 1603                                     [place]                                                                Pathologist:           Fide Weiner MD                                                      Specimen:    Skin, Other, Specimen A: Left Temporal Hairline; Shave                                  Final Diagnosis  A. Skin, Left Temporal Hairline, Shave Biopsy:  PIGMENTED BASAL CELL CARCINOMA, NODULAR/SUPERFICIAL TYPES; extending to biopsy margins.       Electronically signed by Fide Weiner MD on 12/18/2023 at  9:45 AM

## 2024-01-15 ENCOUNTER — TELEPHONE (OUTPATIENT)
Dept: DERMATOLOGY | Facility: CLINIC | Age: 49
End: 2024-01-15

## 2024-01-15 NOTE — TELEPHONE ENCOUNTER
Pre- operative Mohs Telephone Scheduling Note    Do you have a pacemaker, defibrillator, spinal or brain stimulator? no    Do you take antibiotics before skin or dental procedures? no  If yes, will likely require pre-operative antibiotics. Ask  the patient why they take the antibiotics (usually because of joint replacement).    Do you have a history of a joint replacements within the past 2 years? no   If yes, will likely require pre-operative antibiotics. Ask if orthopaedic surgeon has prescribed pre-operative antibiotics to take before procedures/dental work?    Do you take any OTC medications that thin your blood (Aspirin, Aleve, Ibuprofen) or supplements that thin your blood (fish oil, garlic, vitamin E, Ginko Biloba)? no    Do you take any prescribed medications that thin your blood (Coumadin, Plavix, Xarelto, Eliquis or another prescribed blood thinner)? no    Do you have an allergy to lidocaine or epinephrine? no    Do you have allergies to Iodine? no    Do you wear a lidocaine patch? no    Have you ever been diagnosed with HIV, AIDS, Hep B and Hep C? no    Do you use a cane, walker or wheelchair? no    Is the patient from a nursing home? no If yes, Is there any special accommodations that is needed for patient n/a    Do you smoke? no      If yes,  patient to try and stop 2 days before surgery and 7 days after the surgery. Minimizing smoking as much as possible during this time will improve healing and the cosmetic result after surgery.    Do you use supplemental oxygen? If so, how many liters and can you be off it for a short period of time? N/a    Date scheduled: March 12, 2024 @ 9:00 am with Dr. Ahuja    Coordination of Care with other provider (Oculoplastics, Plastics, ENT) required? no   IF YES, PLEASE FORWARD TO APPROPRIATE PERSONNEL TO HELP COORDINATE.    Are there remaining tumors to be scheduled? no    Was Prior Authorization obtained? No (please use [email or web address] to document prior auth)

## 2024-01-15 NOTE — LETTER
Shakeel Tate     1975    [address]  [place] [address]    Dear Shakeel Tate,    You are scheduled to have the MOHS procedure on March 12, 2024 at 9:00 am for left hairline with Dr.Nadia Ahuja. Our office is located in [facility] our address is [address] [place] [address]. Once you arrive please check in with our front staff in [address] and they will escort you to the MOHS waiting room.  If you have someone bringing you to your appointment they may wait in the waiting room or accompany you in your visit.      Below you will find some pre-op instructions along with some information regarding the MOHS procedure.     If you have any questions please call our office at [phone].       Thank you,    [facility]         PRE-OPERATIVE INSTRUCTIONS - MOHS    Before your scheduled surgery, there are a number of important precautions and positive steps you should take to help prepare yourself for a successful treatment and speedy recovery.    Some of the steps, which are listed below, may seem unnecessary and inconvenient, but they are important. For example, when you stop smoking, you increase your ability to heal. Occasionally, there may be valid reasons, personal or medical, why you can't comply. In such cases, please call the office so we can discuss possible ways to overcome any obstacles you may be encountering.    If you have any questions about the surgery, or remember additional medical information that you forgot to mention to our staff, please contact the office prior to your surgery.    GENERAL INFORMATION REGARDING MOHS MICROGRAPHIC SURGERY    Mohs surgery is a specialized technique for the removal of skin cancer developed by Dr. Frederick Mohs over 50 years ago to improve the cure rates of skin cancer. Traditionally, skin cancers are treated by destructive methods (radiation, freezing, scraping, and burning) or  excision (cutting out the tissue with standards margins and sending it to an outside laboratory for testing). These methods all yield cure rates between 65%-94%. However, for cancers located in cosmetically sensitive areas, large tumors, or tumors unsuccessfully treated by other means, Mohs surgery offers a higher cure rate. In most cases, Mohs surgery provides you with a 99% cure rate for primary (previously untreated) basal cell cancer and a 95% cure rate for primary squamous cell cancer. In Mohs surgery, tissue is removed and processed in a way that we are able to check 100% of the margins, giving the highest cure rate for any method of treating skin cancers while providing maximal preservation of normal skin. This allows the surgeon to produce an optimal cosmetic result for the patient by maximizing the amount of tissue removed yielding as small a scar as possible    On the day of surgery, you will be given local anesthesia only (similar to what was given to you during your initial biopsy). You will remain awake. You will verify the location of the skin cancer prior to the onset of the surgery. Once the area is numb, the tissue containing the skin cancer will be removed, taking a small safety margin. This margin is usually smaller than what would be taken with a standard excision. Once the tissue is removed, it is marked and oriented. The first layer (“Stage I”) will be processed in our laboratory. The wound will be treated for bleeding and a bandage will be placed to keep you comfortable while you wait an approximate 45 minutes-1 hour (for the processing of the tissue) in your room. Your Mohs surgeon will examine the pathology in the lab, checking all the margins. If any tumor remains, you will need to take a second layer of skin (“Stage 2”). The area will be re-anesthetized and your Mohs surgeon will remove more skin only in the area where the tumor exists. This process will continue until all the skin cancer  is removed. Unfortunately, there is no method to predict how many layers or stages will be taken.    Once the tumor has been removed completely, we will discuss the best ways to close the defect. Most wounds may be closed with stitches. A larger wound may require a skin graft or a flap. In rare instances, especially for cancers around the eye or for larger cancers, we may work with another surgeon (oculoplastic, ENT, plastics) with special skills to assist with reconstruction.    Medications: Please take all your normal medications the morning of your surgery. If you are a diabetic, please bring your insulin or medications with you, as well as a snack to avoid having low blood sugar during your day with us.     Blood Thinners    VERY IMPORTANT: We do NOT stop or hold prescribed blood thinners (such as Coumadin/Warfarin, Plavix, Eliquis, Pradaxa, Brilinta, Apixaban, Xarelto, Lovonox, Rivaroxaban, or Aggrenox) before Mohs surgery. Additionally, If you take aspirin because you have had a stroke, heart attack, heart disease, other condition, or your physician has prescribed you to take it, please continue your aspirin.    Although there is a risk of increased bruising and bleeding, we are still able to safely perform surgery while continuing these medications. Please NEVER stop your prescribed blood thinner without the managing doctor's (the doctor that prescribed the medication) permission or knowledge. If you have any concerns about not holding your blood thinner, please address these with your Mohs surgeon.    Most people should stop all non-steroidal anti-inflammatory medications (Motrin, Naproxen, Advil, Midol, Aleve, etc.) for 7 days prior to your scheduled surgery and 2 days after (unless instructed otherwise after surgery).  You may take Tylenol for pain.     Antibiotics  If you usually require antibiotics prior to dental work, please let the office know at least 24 hours prior to your surgery. Medical  conditions that sometimes require preoperative antibiotics include artificial heart valves, heart murmurs, artificial joints, and related problems. We may give you a different medication than the dentist, so please contact us for the correct antibiotic.  If you were prescribed pre-operative antibiotics by our office, please take the medication 2 hours prior to your procedure.    Vitamins and Supplements  Avoid taking any supplements with Vitamin E, Fish Oil, Gingko, Ginseng, and Garlic for 2 weeks before and 2 days after your surgery. These thin your blood.    Alcohol: Avoid drinking alcohol for 2 days prior to your surgery, and for 2 days afterwards (it thins the blood and causes more bruising and swelling).    Smoking: Try to STOP or reduce smoking significantly the week before your surgery, and especially the week afterwards (it greatly improves how well you heal). Tobacco smoke deprives the blood of oxygen, which is urgently needed by the wound during the healing process.    Contact Lenses: Do not wear them on the day of the surgery. Instead, wear glasses and bring your case, in case we need to remove them.    Clothing: Do not wear your nicest clothing on your surgery day. We recommend wearing a button down shirt that will not disrupt your post-operative dressing when changing later that night.    Bathing: On the morning of your surgery, you may bathe or shower normally. If you get your hair done on a weekly basis, remember to get your hair washed the day before surgery.   - You will need to keep your surgical site dry for a minimum of 48 hours after surgery.    Makeup: If your surgery is on the face, please do not wear any makeup on the day of the surgery.    Jewelry: Please try to avoid wearing jewelry on the day of surgery.    Food: On the morning of surgery, have breakfast but limit your intake of caffeinated beverages. They are diuretic and may inconvenience you during surgery. If you are following up with  another surgeon the same day as your Mohs surgery, you must receive permission to eat breakfast from that surgeon.      What to bring with you on the day of your surgery:  Bring snacks - Since you could be at the office long, you may bring snacks and/or lunch with you. Some snacks and drinks are available at the office as well.   Bring a sweater - Bring a sweater or jacket that buttons or zips down the front and will not disturb your wound dressing during removal.  Bring something to do - You will be spending much of the day in our office. There will be 45-60 minute waiting periods  between layers/stages, and while there is a television with cable in every room, it is nice to have something to keep you occupied such as books, magazines, knitting, music, or work.     Planning Ahead:  Other Appointments - It is important to realize that no matter how small the skin cancer appears to be, looks can be deceiving. Since your surgery may last the entire day, you should not schedule any other appointments that day.  Special Occasions - Surgery often creates swelling and bruising. Also, the post-op dressing may be rather large and obvious. Keep this in mind as you arrange your social/work schedule. If an important event is already planned, please check with your referring physician or your Mohs surgeon to see if the surgery can be postponed.  Activity Limits after Surgery - If surgery was performed on your face, we recommend that you keep your activity level to a minimum for 2-3 days (the blood pressure elevation related to exercise can lead to bleeding). If you have stitches in an area that will be under tension or significant movement (neck, back, arms, legs), you will need to avoid heavy lifting (anything over 5 lbs) or exercise for at least 2 weeks and possibly longer. We also advise that you limit out of town travel for the first 7 days after surgery. You should also wait at least 7 days before going into a pool or the  ocean.  Housework - Since you will need to minimize activity after surgery, plan to do your groceries, laundry, gardening, and other heavy household chores prior to your surgery. Please make arrangements for assistance during the post-op period. If surgery is around your mouth area, you may need to eat soft foods, such as soup, milkshakes, or yogurt for 48 hours.    Purchasing bandage supplies: Prior to surgery, please purchase the following items to care for your surgical wound properly.  Cotton swabs (Q-tips)  Vaseline or Aquaphor  Telfa pads (or any non-stick dressing)  Paper tape or Hypafix tape  Gauze pads (3x3)      We will provide you with some bandage supplies after surgery to get you started.    Transportation: It is often reassuring and comforting to have a  drive you to and from the surgery. He or she is welcome to wait in the office during the surgery. Please note that for safety reasons, only the patient is allowed in the procedure room during surgery. Thank you for your cooperation.    Rescheduling: If you need to reschedule your surgery, please notify the office as soon as possible.

## 2024-01-30 ENCOUNTER — OFFICE VISIT (OUTPATIENT)
Dept: FAMILY MEDICINE CLINIC | Facility: CLINIC | Age: 49
End: 2024-01-30
Payer: COMMERCIAL

## 2024-01-30 VITALS
RESPIRATION RATE: 16 BRPM | OXYGEN SATURATION: 99 % | BODY MASS INDEX: 25.76 KG/M2 | SYSTOLIC BLOOD PRESSURE: 120 MMHG | WEIGHT: 184 LBS | HEIGHT: 71 IN | HEART RATE: 71 BPM | TEMPERATURE: 98 F | DIASTOLIC BLOOD PRESSURE: 70 MMHG

## 2024-01-30 DIAGNOSIS — J20.9 ACUTE BRONCHITIS, UNSPECIFIED ORGANISM: Primary | ICD-10-CM

## 2024-01-30 PROCEDURE — 99213 OFFICE O/P EST LOW 20 MIN: CPT | Performed by: FAMILY MEDICINE

## 2024-01-30 RX ORDER — ALBUTEROL SULFATE 90 UG/1
2 AEROSOL, METERED RESPIRATORY (INHALATION) EVERY 6 HOURS PRN
Qty: 18 G | Refills: 0 | Status: SHIPPED | OUTPATIENT
Start: 2024-01-30

## 2024-01-30 RX ORDER — AZITHROMYCIN 250 MG/1
TABLET, FILM COATED ORAL DAILY
Qty: 6 TABLET | Refills: 0 | Status: SHIPPED | OUTPATIENT
Start: 2024-01-30 | End: 2024-02-04

## 2024-01-30 RX ORDER — PREDNISONE 20 MG/1
40 TABLET ORAL DAILY
Qty: 8 TABLET | Refills: 0 | Status: SHIPPED | OUTPATIENT
Start: 2024-01-30 | End: 2024-02-03

## 2024-01-30 NOTE — ASSESSMENT & PLAN NOTE
He has bilateral wheezing and rhonchi, will start him on prednisone Zithromax and inhaler as needed, he was negative for COVID his symptoms are going on for 1 week now

## 2024-01-30 NOTE — PROGRESS NOTES
Name: Shakeel Tate      : 1975      MRN: [id number]  Encounter Provider: Krista Whitaker MD  Encounter Date: 2024   Encounter department: [facility]    Assessment & Plan     1. Acute bronchitis, unspecified organism  Assessment & Plan:  He has bilateral wheezing and rhonchi, will start him on prednisone Zithromax and inhaler as needed, he was negative for COVID his symptoms are going on for 1 week now    Orders:  -     predniSONE 20 mg tablet; Take 2 tablets (40 mg total) by mouth daily for 4 days  -     albuterol (Ventolin HFA) 90 mcg/act inhaler; Inhale 2 puffs every 6 (six) hours as needed for wheezing  -     azithromycin (Zithromax) 250 mg tablet; Take 2 tablets (500 mg total) by mouth daily for 1 day, THEN 1 tablet (250 mg total) daily for 4 days.         Subjective     He says last wk started chills , body aches, back pain cough , sweaty , now cold , and chest congestion , took night quil ,took mucinex yesterday , in morning yellow phlegm , non smoker, no hx of asthma , had covid test negative at home last Friday  Son was negative for flu , covid and mono      Review of Systems   Constitutional: Negative.    HENT: Negative.     Eyes: Negative.    Respiratory:  Positive for cough and wheezing.    Hematological: Negative.    Psychiatric/Behavioral: Negative.         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 No pertinent surgical history.  Family History   Problem Relation Age of Onset   • Heart disease Mother    • Diabetes Mother    • Hypertension Mother    • Hyperlipidemia Mother    • Kidney failure Mother    • Heart disease Father    • Diabetes Father    • Hyperlipidemia Father    • Diabetes Sister    • Diabetes Sister    • Diabetes Sister      Social History     Socioeconomic History   • Marital status: /Civil Union     Spouse name: None   • Number of children: 2   • Years of education: None   • Highest education level: None   Occupational History   • None   Tobacco Use   • Smoking status: Never   • Smokeless tobacco: Never   Vaping Use   • Vaping status: Never Used   Substance and Sexual Activity   • Alcohol use: Never   • Drug use: Never   • Sexual activity: None   Other Topics Concern   • None   Social History Narrative    Caffeine use     Social Determinants of Health     Financial Resource Strain: Not on file   Food Insecurity: Not on file   Transportation Needs: Not on file   Physical Activity: Not on file   Stress: Not on file   Social Connections: Not on file   Intimate Partner Violence: Not on file   Housing Stability: Not on file     Current Outpatient Medications on File Prior to Visit   Medication Sig   • Multiple Vitamin (MULTIVITAMIN ADULT PO) Take by mouth     Allergies   Allergen Reactions   • Penicillin G Abdominal Pain, Allergic Rhinitis, Anaphylaxis, Angioedema, Anxiety, Arthralgia, Blisters, Bradycardia, Chest Pain, Confusion, Cough, Delirium, Dermatitis, Diarrhea, Dizziness, Drowsiness, Edema, Eye Swelling, Facial Swelling, Fatigue, Fever, GI Bleeding, GI Intolerance, Hallucinations, Headache, Hemorrhagic stroke, Hives, Hyperactivity, Hypertension, Irritability, Itching, Lightheadedness, Lip Swelling, Myalgia, Nasal Congestion, Nausea Only, Other (See Comments), Palpitations, Photosensitivity, Rash, Seizures, Shortness Of  "Breath, Sneezing, Swelling, Syncope, Tachycardia, Throat Swelling, Tinnitus, Tongue Swelling, Tremor, Visual Disturbance, Vomiting and Wheezing   • Penicillins      Reaction Date: 23May2008;  Unknown- infant allergy    • Pollen Extract      Immunization History   Administered Date(s) Administered   • COVID-19 PFIZER VACCINE 0.3 ML IM 03/23/2021, 04/14/2021, 12/07/2021   • INFLUENZA 10/09/2015, 12/02/2016, 10/06/2017, 10/01/2018   • Influenza Quadrivalent, 6-35 Months IM 10/09/2015, 12/02/2016, 10/06/2017   • Influenza, injectable, quadrivalent, preservative free 0.5 mL 11/28/2022   • Influenza, seasonal, injectable 10/28/2008, 09/25/2009   • Td (adult), adsorbed 01/28/2011   • Tdap 04/29/2021       Objective     /70   Pulse 71   Temp 98 °F (36.7 °C)   Resp 16   Ht 5' 11\" (1.803 m)   Wt 83.5 kg (184 lb)   SpO2 99%   BMI 25.66 kg/m²     Physical Exam  Vitals and nursing note reviewed.   Constitutional:       Appearance: Normal appearance. He is well-developed. He is not ill-appearing.   Eyes:      Extraocular Movements: Extraocular movements intact.   Neck:      Thyroid: No thyromegaly.   Cardiovascular:      Rate and Rhythm: Normal rate and regular rhythm.      Heart sounds: Normal heart sounds. No murmur heard.  Pulmonary:      Effort: Pulmonary effort is normal.      Breath sounds: Wheezing and rhonchi present. No rales.   Musculoskeletal:      Cervical back: Normal range of motion and neck supple.   Skin:     Findings: No erythema or rash.   Neurological:      Mental Status: He is alert.       Krista Whitaker MD    "

## 2024-02-21 DIAGNOSIS — J20.9 ACUTE BRONCHITIS, UNSPECIFIED ORGANISM: ICD-10-CM

## 2024-02-21 RX ORDER — ALBUTEROL SULFATE 90 UG/1
AEROSOL, METERED RESPIRATORY (INHALATION)
Qty: 8.5 G | Refills: 5 | Status: SHIPPED | OUTPATIENT
Start: 2024-02-21

## 2024-03-12 ENCOUNTER — PROCEDURE VISIT (OUTPATIENT)
Dept: DERMATOLOGY | Facility: CLINIC | Age: 49
End: 2024-03-12
Payer: COMMERCIAL

## 2024-03-12 VITALS
WEIGHT: 199.2 LBS | TEMPERATURE: 99.1 F | DIASTOLIC BLOOD PRESSURE: 68 MMHG | BODY MASS INDEX: 27.89 KG/M2 | HEART RATE: 81 BPM | SYSTOLIC BLOOD PRESSURE: 114 MMHG | OXYGEN SATURATION: 98 % | RESPIRATION RATE: 16 BRPM | HEIGHT: 71 IN

## 2024-03-12 DIAGNOSIS — C44.41 BASAL CELL CARCINOMA (BCC) OF SCALP: ICD-10-CM

## 2024-03-12 PROCEDURE — 17311 MOHS 1 STAGE H/N/HF/G: CPT | Performed by: STUDENT IN AN ORGANIZED HEALTH CARE EDUCATION/TRAINING PROGRAM

## 2024-03-12 PROCEDURE — 12032 INTMD RPR S/A/T/EXT 2.6-7.5: CPT | Performed by: STUDENT IN AN ORGANIZED HEALTH CARE EDUCATION/TRAINING PROGRAM

## 2024-03-12 PROCEDURE — 17312 MOHS ADDL STAGE: CPT | Performed by: STUDENT IN AN ORGANIZED HEALTH CARE EDUCATION/TRAINING PROGRAM

## 2024-03-12 NOTE — PROGRESS NOTES
MOHS Procedure Note    Patient: Shakeel Tate  : 1975  MRN: [id number]  Date: 2024    History of Present Illness: The patient is a 48 y.o. male who presents with complaints of basal cell carcinoma on the left temporal hairline.    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 Ahuja         Current Outpatient Medications:     albuterol (PROVENTIL HFA,VENTOLIN HFA) 90 mcg/act inhaler, INHALE 2 PUFFS EVERY 6 HOURS AS NEEDED FOR WHEEZING, Disp: 8.5 g, Rfl: 5    Multiple Vitamin (MULTIVITAMIN ADULT PO), Take by mouth, Disp: , Rfl:     Allergies   Allergen Reactions    Penicillin G Abdominal Pain, Allergic Rhinitis, Anaphylaxis, Angioedema, Anxiety, Arthralgia, Blisters, Bradycardia, Chest Pain, Confusion, Cough, Delirium, Dermatitis, Diarrhea, Dizziness, Drowsiness, Edema, Eye Swelling, Facial Swelling, Fatigue, Fever, GI Bleeding, GI Intolerance, Hallucinations, Headache, Hemorrhagic stroke, Hives, Hyperactivity, Hypertension, Irritability, Itching, Lightheadedness, Lip Swelling, Myalgia, Nasal Congestion, Nausea Only, Other (See Comments), Palpitations, Photosensitivity, Rash, Seizures, Shortness Of Breath, Sneezing, Swelling,  Syncope, Tachycardia, Throat Swelling, Tinnitus, Tongue Swelling, Tremor, Visual Disturbance, Vomiting and Wheezing    Penicillins      Reaction Date: 23May2008;  Unknown- infant allergy     Pollen Extract        Physical Exam:   Vitals:    03/12/24 0901   BP: 114/68   Pulse: 81   Resp: 16   Temp: 99.1 °F (37.3 °C)   SpO2: 98%     General: Awake, Alert, Oriented x 3, Mood and affect appropriate  Respiratory: Respirations even and unlabored  Cardiovascular: Peripheral pulses intact; no edema  Musculoskeletal Exam: n/a    Skin: 0.7 cm x 0.8 cm pigmented nodule on the left temporal hairline.     Assessment: Biopsy proven to be basal cell carcinoma, pigmented, nodular, superficial.    Plan: Mohs    Time of H&P Completion: 0915    MOHS Procedure Timeout      Flowsheet Row Most Recent Value   Timeout: 0925   Patient Identity Verified: Yes   Correct Site Verified: Yes   Correct Procedure Verified: Yes            MOHS Diagnosis/Indication/Location/ID      Flowsheet Row Most Recent Value   Pathology Type Basal cell carcinoma   Anatomic Site --  [left termporal hairline]   Indications for MOHS tumor location   MOHS ID [id number]            MOHS Site/Accession/Pre-Post      Flowsheet Row Most Recent Value   Original Site Identified (as submitted by referring clinician) Photo, Referral   Biopsy Accession/Specimen # (as submitted by referring clincian) [id number]   Pre-MOHS Size Length (cm) 0.7   Pre-MOHS Size Width (cm) 0.8   Post-MOHS Size-Length (cm) 1.1   Post MOHS Size-Width (cm) 0.9   Repair Type Intermediate layered closure   Suture Type Vicryl, Monocryl plus, Fast absorbing gut   Fast Absorbing Suture Size 5   Monocryl Plus Suture Size 5   Vicryl Suture Size 5  [and 4]   Final repair length (cm): 2.8   Anesthetic Used 1% Lidocaine with epinephrine  [buffered]            MOHS Tumor Stage 1 Information      Flowsheet Row Most Recent Value   Tissue Sections (blocks) 2   Microscopic Exam Section 1: Arising focally from the  epidermis and superficial hair follicles are small, superficial, multicentric buds of basaloid keratinocytes. The cells have scant cytoplasm and round dark nuclei. Mitotic figures and apoptotic bodies are evident. The nuclei at the periphery of the islands have a palisaded arrangement. The islands are associated with a fibromyxoid stroma and there is cleft formation between some of the islands and stroma. The pathology is consistent with superficial basal cell carcinoma.      Microscopic Exam Section 2: No tumor identified in section.   Tumor Clear After Stage I? No            MOHS Tumor Stage 2 Information      Flowsheet Row Most Recent Value   Tissue Sections (blocks) 1   Microscopic Exam Section 1: No tumor identified in section.   Tumor Clear After Stage II? Yes                              Patient identified, procedure verified, site identified and verified. Time out completed. Surgical removal of the lesion discussed with the patient (risks and benefits, including possibility of scarring, infection, recurrence or potential for further treatment)  I have specifically identified the site with the patient. I have discussed the fact that the patient will have a scar after the procedure regardless of granulation or repair with sutures. I have discussed that the repair options can range from granulation in some cases to linear or curvilinear closures to larger flaps or grafts.  There are sometimes flaps or grafts used that require multiples stages of surgery and will not be completed today, rather be completed over a series of appointments. I have discussed that occasionally due to location, size or depth of the lesion I may recommend consultation with and transfer of care for further removal or the reconstruction to another provider such as ophthalmology surgery, plastic surgery, ENT surgery, or surgical oncology. There are cases in which other testing such as imaging with MRI or CT scan or testing of lymph nodes  is recommended because of the nature/depth/location of tumor seen during the removal. There is a risk of injury to nerves causing temporary or permanent numbness or the inability to move muscles full such as the inability to lift eyebrows. Questions answered and verbal and written consent was obtained.    The tumor qualifies for Mohs based on AUC criteria. Dr. Ahuja  served as the surgeon and pathologist during the procedure.    With the patient in the supine position and under adequate local anesthesia with 1% lidocaine with epinephrine 1:100,000, the defect was scrubbed with Chlorhexidine. Sterile drapes were placed from the sterile tray.  Because of the location of the surgical defect, an intermediate closure was judged to give the best possible cosmetic and functional result.  The edges of the defect were carefully debrided removing any dead or coagulated tissue.      Hemostasis was obtained by pinpoint electrocoagulation.  Careful planning of removal of redundant tissue at either end of the defect was drawn out so that the suture lines would fall in the optimal orientation with regard to the relaxed skin tension lines.  These were then removed with a #15 blade scalpel.  The wound was then approximated by a deep layer of buried vertical mattress sutures and the cutaneous margins were approximated and closed by superficial sutures as noted above.  Estimated blood loss was less than 5 mL.      The patient tolerated the procedure well.  The wound was dressed with petrolatum, a non-stick pad, and a compression dressing.     Ana Ahuja MD served as the surgeon and pathologist during the procedure.    Postoperative care: Wound care discussed at length.  I urged the patient to call us if any problems or question should arise.     Complications: none  Post-op medications: none  Patient condition after procedure: stable  Discharge plans: Plan for follow up as planned with general dermatology for skin checks or sooner  if needed for healing Mohs site.       Scribe Attestation      I,:  Natalie Dee RN am acting as a scribe while in the presence of the attending physician.:       I,:  Ana Ahuja MD personally performed the services described in this documentation    as scribed in my presence.:

## 2024-03-12 NOTE — PATIENT INSTRUCTIONS
"Mohs Microscopic Surgery After Care    WOUND CARE AFTER SURGERY:    Do NOT to remove the pressure bandage for 48 hours. Keep the area clean and dry while this bandage is on.    After removing the bandage for the first time, gently clean the area with soap and water. If the bandage is difficult to remove, getting the bandage wet in the shower will sometimes help soften the adhesive and allow it to be removed more easily.     You will now need to cleanse this area daily in the shower with gentle soap. There is no need to scrub the area. You will need to apply plain Vaseline ointment (this is over the counter and not a prescription) to the site for up to 2 weeks followed by a clean appropriately sized bandage to area.  Non stick dressing and paper tape (or Hypafix) are recommended for sensitive skin but a bandaid is fine if it covers the area well.    All your stitches will dissolve over the next two weeks. You will need to keep these moist with Vaseline and covered with a bandage over the next 2 weeks for them to dissolve appropriately.    RESTRICTIONS:     For two DAYS:   - You will need to take it very easy as this time is highest risk for bleeding. Being a \"couch potato\" during these two days is generally recommended.   - For surgeries on the face/neck/scalp: Avoid leaning down to pick things up off the floor as this brings blood up to your head. Instead, squat down to pick things up.     For two WEEKS:   - No heavy lifting (anything greater than 10 pounds)   - You can start to do slow, gentle activities such as slow walking but nothing to increase your heart rate and blood pressure too much (such as cardiovascular exercise). It is important to take it easy as there is still a risk for bleeding and a high risk popping of stitches open during this time.     If we did surgery near the eyes (including the nose, forehead, front part of your scalp, cheeks): It is VERY common to get a large amount of swelling around your " eyes (puffy eyes). Although less frequent, this can be enough to swell your eyes shut and can also come along with bruising. This should not hurt and is very expected and normal. It is typically worst at ~ 3 days out from your surgery and dramatically better 1 week post-operatively.         MANAGING YOUR PAIN AFTER SURGERY     You can expect to have some pain after surgery. This is normal. The pain is typically worse the first two days after surgery, and quickly begins to get better.     The best strategy for controlling your pain after surgery is around the clock pain control. You can take over the counter Acetaminophen (Tylenol) for discomfort, if no contraindications.     If you are taking this at the maximum dose, you can alternate this with Motrin (ibuprofen or Advil) as well. Alternating these medications with each other allows you to maximize your pain control. In addition to Tylenol and Motrin, you can use heating pads or ice packs on your incisions to help reduce your pain.     How will I alternate your regular strength over-the-counter pain medication?  You will take a dose of pain medication every three hours.   Start by taking 650 mg of Tylenol (2 pills of 325 mg)   3 hours later take 600 mg of Motrin (3 pills of 200 mg)   3 hours after taking the Motrin take 650 mg of Tylenol   3 hours after that take 600 mg of Motrin.    See example - if your first dose of Tylenol is at 12:00 PM     12:00 PM  Tylenol 650 mg (2 pills of 325 mg)    3:00 PM  Motrin 600 mg (3 pills of 200 mg)    6:00 PM  Tylenol 650 mg (2 pills of 325 mg)    9:00 PM  Motrin 600 mg (3 pills of 200 mg)    Continue alternating every 3 hours      Important:   Do not take more than 4000mg of Tylenol or 3200mg of Motrin in a 24-hour period.     What if I still have pain?   If you have pain that is not controlled with the over-the-counter pain medications (Tylenol and Motrin or Advil), don't hesitate to call our staff using the number provided.  We will help make sure you are managing your pain in the best way possible, and if necessary, we can provide a prescription for additional pain medication.       CALL OUR OFFICE IMMEDIATELY FOR ANY SIGNS OF INFECTION:    This includes fever, chills, increased redness, warmth, tenderness, severe discomfort/pain, or pus or foul smell coming from the wound. If you are experiencing any of the above, please call [facility]'s Mohs Department directly at [phone].    IF BLEEDING IS NOTICED:    Place a clean cloth over the area and apply firm pressure for thirty minutes.  Check the wound ONLY after 30 minutes of direct pressure; do not cheat and sneak a peak, as that does not count.  If bleeding persists after 30 minutes of legitimate direct pressure, then try one more round of direct pressure to the area.  Should the bleeding become heavier or not stop after the second attempt, call [facility] directly at [phone]. Your call will get routed to the dermatology surgeon on call even after hours.

## 2024-09-19 ENCOUNTER — OFFICE VISIT (OUTPATIENT)
Dept: DERMATOLOGY | Facility: CLINIC | Age: 49
End: 2024-09-19

## 2024-09-19 VITALS — BODY MASS INDEX: 29.12 KG/M2 | TEMPERATURE: 97.7 F | WEIGHT: 208 LBS | HEIGHT: 71 IN

## 2024-09-19 DIAGNOSIS — L81.4 LENTIGO: ICD-10-CM

## 2024-09-19 DIAGNOSIS — Z85.828 HISTORY OF BASAL CELL CARCINOMA (BCC): Primary | ICD-10-CM

## 2024-09-19 DIAGNOSIS — D48.5 NEOPLASM OF UNCERTAIN BEHAVIOR OF SKIN: ICD-10-CM

## 2024-09-19 DIAGNOSIS — D22.9 MULTIPLE BENIGN MELANOCYTIC NEVI: ICD-10-CM

## 2024-09-19 DIAGNOSIS — D18.01 CHERRY ANGIOMA: ICD-10-CM

## 2024-09-19 PROCEDURE — 88342 IMHCHEM/IMCYTCHM 1ST ANTB: CPT | Performed by: STUDENT IN AN ORGANIZED HEALTH CARE EDUCATION/TRAINING PROGRAM

## 2024-09-19 PROCEDURE — 88341 IMHCHEM/IMCYTCHM EA ADD ANTB: CPT | Performed by: STUDENT IN AN ORGANIZED HEALTH CARE EDUCATION/TRAINING PROGRAM

## 2024-09-19 PROCEDURE — 88307 TISSUE EXAM BY PATHOLOGIST: CPT | Performed by: STUDENT IN AN ORGANIZED HEALTH CARE EDUCATION/TRAINING PROGRAM

## 2024-09-19 NOTE — PROGRESS NOTES
"[facility] Dermatology Clinic Note     Patient Name: Shakeel Tate  Encounter Date: 9/19/24     Have you been cared for by a [facility] Dermatologist in the last 3 years and, if so, which description applies to you?    Yes.  I have been here within the last 3 years, and my medical history has NOT changed since that time.  I am MALE/not capable of bearing children.    REVIEW OF SYSTEMS:  Have you recently had or currently have any of the following? No changes in my recent health.   PAST MEDICAL HISTORY:  Have you personally ever had or currently have any of the following?  If \"YES,\" then please provide more detail. No changes in my medical history.   HISTORY OF IMMUNOSUPPRESSION: Do you have a history of any of the following:  Systemic Immunosuppression such as Diabetes, Biologic or Immunotherapy, Chemotherapy, Organ Transplantation, Bone Marrow Transplantation or Prednisone?  No     Answering \"YES\" requires the addition of the dotphrase \"IMMUNOSUPPRESSED\" as the first diagnosis of the patient's visit.   FAMILY HISTORY:  Any \"first degree relatives\" (parent, brother, sister, or child) with the following?    No changes in my family's known health.   PATIENT EXPERIENCE:    Do you want the Dermatologist to perform a COMPLETE skin exam today including a clinical examination under the \"bra and underwear\" areas?  Yes  If necessary, do we have your permission to call and leave a detailed message on your Preferred Phone number that includes your specific medical information?  Yes      Allergies   Allergen Reactions    Penicillin G Abdominal Pain, Allergic Rhinitis, Anaphylaxis, Angioedema, Anxiety, Arthralgia, Blisters, Bradycardia, Chest Pain, Confusion, Cough, Delirium, Dermatitis, Diarrhea, Dizziness, Drowsiness, Edema, Eye Swelling, Facial Swelling, Fatigue, Fever, GI Bleeding, GI Intolerance, Hallucinations, Headache, Hemorrhagic stroke, Hives, Hyperactivity, Hypertension, Irritability, Itching, Lightheadedness, Lip " "Swelling, Myalgia, Nasal Congestion, Nausea Only, Other (See Comments), Palpitations, Photosensitivity, Rash, Seizures, Shortness Of Breath, Sneezing, Swelling, Syncope, Tachycardia, Throat Swelling, Tinnitus, Tongue Swelling, Tremor, Visual Disturbance, Vomiting and Wheezing    Penicillins      Reaction Date: 23May2008;  Unknown- infant allergy     Pollen Extract       Current Outpatient Medications:     albuterol (PROVENTIL HFA,VENTOLIN HFA) 90 mcg/act inhaler, INHALE 2 PUFFS EVERY 6 HOURS AS NEEDED FOR WHEEZING, Disp: 8.5 g, Rfl: 5    Multiple Vitamin (MULTIVITAMIN ADULT PO), Take by mouth, Disp: , Rfl:           Whom besides the patient is providing clinical information about today's encounter?   NO ADDITIONAL HISTORIAN (patient alone provided history)    Physical Exam and Assessment/Plan by Diagnosis:    HISTORY OF BASAL CELL CARCINOMA    Physical Exam:  Anatomic Location Affected:  left temporal hairline  Morphological Description of scar:  well healed  Suspected Recurrence: No      Additional History of Present Condition:  History of basal cell carcinoma with no sign of recurrence. Treated with Mohs surgery by Dr. Ahuja on 3/12/24.      MELANOCYTIC NEVI (\"Moles\")    Physical Exam:  Anatomic Location Affected:   Mostly on sun-exposed areas of the trunk and extremities  Morphological Description:  Scattered, 1-4mm round to ovoid, symmetrical-appearing, even bordered, skin colored to dark brown macules/papules      Additional History of Present Condition:  Present on exam. Denies any pain, itch, bleeding. Present for years. Denies actively changing or growing moles.     Assessment and Plan:  Based on a thorough discussion of this condition and the management approach to it (including a comprehensive discussion of the known risks, side effects and potential benefits of treatment), the patient (family) agrees to implement the following specific plan:  Reassured benign.   Monitor for changes. ABCDE's of melanoma " "handout provided.   Practice sun protection. Apply broad spectrum (UVA and UVB) sunscreen, SPF 30 or higher every 2 hours. Wear sun protective clothing, hats, and sunglasses.        LENTIGO    Physical Exam:  Anatomic Location Affected:  sun exposed areas of trunk and upper extremities  Morphological Description:  multiple scattered tan to brown evenly pigmented macules      Additional History of Present Condition:  Present on exam.     Assessment and Plan:  Based on a thorough discussion of this condition and the management approach to it (including a comprehensive discussion of the known risks, side effects and potential benefits of treatment), the patient (family) agrees to implement the following specific plan:  Reassured benign.   Practice sun protection. Apply broad spectrum (UVA and UVB) sunscreen, SPF 30 or higher every 2 hours. Wear sun protective clothing, hats, and sunglasses.       ANGIOMA (\"CHERRY ANGIOMA\")     Physical Exam:  Anatomic Location: scattered across trunk   Morphologic Description: 1-2 mm firm red to maroon to purples to blue discrete papule, on dermoscopy lacunae  by white septae seen       Additional History of Present Condition:  Present on exam.     Plan:  Reassured benign.       NEOPLASM OF UNCERTAIN BEHAVIOR OF SKIN    Physical Exam:  (Anatomic Location); (Size and Morphological Description); (Differential Diagnosis):  Specimen A: left lateral hip; 0.4 cm x 0.4 cm firm pink papule with overlying crust: DDX: traumatized dermatofibroma         Assessment and Plan:  I have discussed with the patient that a sample of skin via a \"skin biopsy” would be potentially helpful to further make a specific diagnosis under the microscope.  Based on a thorough discussion of this condition and the management approach to it (including a comprehensive discussion of the known risks, side effects and potential benefits of treatment), the patient (family) agrees to implement the following specific " plan:    Procedure:  Skin Biopsy.  After a thorough discussion of treatment options and risk/benefits/alternatives (including but not limited to local pain, scarring, dyspigmentation, blistering, possible superinfection, and inability to confirm a diagnosis via histopathology), verbal and written consent were obtained and portion of the rash was biopsied for tissue sample.  See below for consent that was obtained from patient and subsequent Procedure Note.  Will call with results.     PROCEDURE TANGENTIAL (SHAVE) BIOPSY NOTE:    Performing Physician:  Adali Tong PA-C  Anatomic Location; Clinical Description with size (cm); Pre-Op Diagnosis:   Specimen A: left lateral hip; 0.4 cm x 0.4 cm firm pink papule with overlying crust;  DDX: traumatized dermatofibroma   Post-op diagnosis: Same     Local anesthesia: 2% Xylocaine with epi     Topical anesthesia: None    Hemostasis: Aluminum chloride       After obtaining informed consent  at which time there was a discussion about the purpose of biopsy  and low risks of infection and bleeding.  The area was prepped and draped in the usual fashion. Anesthesia was obtained with 1% lidocaine with epinephrine. A shave biopsy to an appropriate sampling depth was obtained by Shave (Dermablade or 15 blade) The resulting wound was covered with surgical ointment and bandaged appropriately.     The patient tolerated the procedure well without complications and was without signs of functional compromise.      Specimen has been sent for review by Dermatopathology.    Standard post-procedure care has been explained and has been included in written form within the patient's copy of Informed Consent.    INFORMED CONSENT DISCUSSION AND POST-OPERATIVE INSTRUCTIONS FOR PATIENT    I.  RATIONALE FOR PROCEDURE  I understand that a skin biopsy allows the Dermatologist to test a lesion or rash under the microscope to obtain a diagnosis.  It usually involves numbing the area with numbing  "medication and removing a small piece of skin; sometimes the area will be closed with sutures. In this specific procedure, sutures are not usually needed.  If any sutures are placed, then they are usually need to be removed in 2 weeks or less.    I understand that my Dermatologist recommends that a skin \"shave\" biopsy be performed today.  A local anesthetic, similar to the kind that a dentist uses when filling a cavity, will be injected with a very small needle into the skin area to be sampled.  The injected skin and tissue underneath \"will go to sleep” and become numb so no pain should be felt afterwards.  An instrument shaped like a tiny \"razor blade\" (shave biopsy instrument) will be used to cut a small piece of tissue and skin from the area so that a sample of tissue can be taken and examined more closely under the microscope.  A slight amount of bleeding will occur, but it will be stopped with direct pressure and a pressure bandage and any other appropriate methods.  I understands that a scar will form where the wound was created.  Surgical ointment will be applied to help protect the wound.  Sutures are not usually needed.    II.  RISKS AND POTENTIAL COMPLICATIONS   I understand the risks and potential complications of a skin biopsy include but are not limited to the following:  Bleeding  Infection  Pain  Scar/keloid  Skin discoloration  Incomplete Removal  Recurrence  Nerve Damage/Numbness/Loss of Function  Allergic Reaction to Anesthesia  Biopsies are diagnostic procedures and based on findings additional treatment or evaluation may be required  Loss or destruction of specimen resulting in no additional findings    My Dermatologist has explained to me the nature of the condition, the nature of the procedure, and the benefits to be reasonably expected compared with alternative approaches.  My Dermatologist has discussed the likelihood of major risks or complications of this procedure including the specific " "risks listed above, such as bleeding, infection, and scarring/keloid.  I understand that a scar is expected after this procedure.  I understand that my physician cannot predict if the scar will form a \"keloid,\" which extends beyond the borders of the wound that is created.  A keloid is a thick, painful, and bumpy scar.  A keloid can be difficult to treat, as it does not always respond well to therapy, which includes injecting cortisone directly into the keloid every few weeks.  While this usually reduces the pain and size of the scar, it does not eliminate it.      I understand that photographs may be taken before and after the procedure.  These will be maintained as part of the medical providers confidential records and may not be made available to me.  I further authorize the medical provider to use the photographs for teaching purposes or to illustrate scientific papers, books, or lectures if in his/her judgment, medical research, education, or science may benefit from its use.    I have had an opportunity to fully inquire about the risks and benefits of this procedure and its alternatives.   I have been given ample time and opportunity to ask questions and to seek a second opinion if I wished to do so.  I acknowledge that there have specifically been no guarantees as to the cosmetic results from the procedure.  I am aware that with any procedure there is always the possibility of an unexpected complication.    III. POST-PROCEDURAL CARE (WHAT YOU WILL NEED TO DO \"AFTER THE BIOPSY\" TO OPTIMIZE HEALING)    Keep the area clean and dry.  Try NOT to remove the bandage or get it wet for the first 24 hours.    Gently clean the area and apply surgical ointment (such as Vaseline petrolatum ointment, which is available \"over the counter\" and not a prescription) to the biopsy site for up to 2 weeks straight.  This acts to protect the wound from the outside world.      Sutures are not usually placed in this procedure.  If " any sutures were placed, return for suture removal as instructed (generally 1 week for the face, 2 weeks for the body).      Take Acetaminophen (Tylenol) for discomfort, if no contraindications.  Ibuprofen or aspirin could make bleeding worse.    Call our office immediately for signs of infection: fever, chills, increased redness, warmth, tenderness, discomfort/pain, or pus or foul smell coming from the wound.    WHAT TO DO IF THERE IS ANY BLEEDING?  If a small amount of bleeding is noticed, place a clean cloth over the area and apply firm pressure for ten minutes.  Check the wound after 10 minutes of direct pressure.  If bleeding persists, try one more time for an additional 10 minutes of direct pressure on the area.  If the bleeding becomes heavier or does not stop after the second attempt, or if you have any other questions about this procedure, then please call your [facility]'s Dermatologist by calling [phone] (SKIN).     I hereby acknowledge that I have reviewed and verified the site with my Dermatologist and have requested and authorized my Dermatologist to proceed with the procedure.    Follow-up: 6 months for skin exam.      Scribe Attestation      I,:  Larisa Fernandez MA am acting as a scribe while in the presence of the attending physician.:       I,:  Adali Tong PA-C personally performed the services described in this documentation    as scribed in my presence.:

## 2024-09-19 NOTE — PATIENT INSTRUCTIONS
"I.  RATIONALE FOR PROCEDURE  I understand that a skin biopsy allows the Dermatologist to test a lesion or rash under the microscope to obtain a diagnosis.  It usually involves numbing the area with numbing medication and removing a small piece of skin; sometimes the area will be closed with sutures. In this specific procedure, sutures are not usually needed.  If any sutures are placed, then they are usually need to be removed in 2 weeks or less.     I understand that my Dermatologist recommends that a skin \"shave\" biopsy be performed today.  A local anesthetic, similar to the kind that a dentist uses when filling a cavity, will be injected with a very small needle into the skin area to be sampled.  The injected skin and tissue underneath \"will go to sleep” and become numb so no pain should be felt afterwards.  An instrument shaped like a tiny \"razor blade\" (shave biopsy instrument) will be used to cut a small piece of tissue and skin from the area so that a sample of tissue can be taken and examined more closely under the microscope.  A slight amount of bleeding will occur, but it will be stopped with direct pressure and a pressure bandage and any other appropriate methods.  I understands that a scar will form where the wound was created.  Surgical ointment will be applied to help protect the wound.  Sutures are not usually needed.     II.  RISKS AND POTENTIAL COMPLICATIONS   I understand the risks and potential complications of a skin biopsy include but are not limited to the following:  Bleeding  Infection  Pain  Scar/keloid  Skin discoloration  Incomplete Removal  Recurrence  Nerve Damage/Numbness/Loss of Function  Allergic Reaction to Anesthesia  Biopsies are diagnostic procedures and based on findings additional treatment or evaluation may be required  Loss or destruction of specimen resulting in no additional findings     My Dermatologist has explained to me the nature of the condition, the nature of the " "procedure, and the benefits to be reasonably expected compared with alternative approaches.  My Dermatologist has discussed the likelihood of major risks or complications of this procedure including the specific risks listed above, such as bleeding, infection, and scarring/keloid.  I understand that a scar is expected after this procedure.  I understand that my physician cannot predict if the scar will form a \"keloid,\" which extends beyond the borders of the wound that is created.  A keloid is a thick, painful, and bumpy scar.  A keloid can be difficult to treat, as it does not always respond well to therapy, which includes injecting cortisone directly into the keloid every few weeks.  While this usually reduces the pain and size of the scar, it does not eliminate it.       I understand that photographs may be taken before and after the procedure.  These will be maintained as part of the medical providers confidential records and may not be made available to me.  I further authorize the medical provider to use the photographs for teaching purposes or to illustrate scientific papers, books, or lectures if in his/her judgment, medical research, education, or science may benefit from its use.     I have had an opportunity to fully inquire about the risks and benefits of this procedure and its alternatives.   I have been given ample time and opportunity to ask questions and to seek a second opinion if I wished to do so.  I acknowledge that there have specifically been no guarantees as to the cosmetic results from the procedure.  I am aware that with any procedure there is always the possibility of an unexpected complication.    III. POST-PROCEDURAL CARE (WHAT YOU WILL NEED TO DO \"AFTER THE BIOPSY\" TO OPTIMIZE HEALING)     Keep the area clean and dry.  Try NOT to remove the bandage or get it wet for the first 24 hours.     Gently clean the area and apply surgical ointment (such as Vaseline petrolatum ointment, which is " "available \"over the counter\" and not a prescription) to the biopsy site for up to 2 weeks straight.  This acts to protect the wound from the outside world.       Sutures are not usually placed in this procedure.  If any sutures were placed, return for suture removal as instructed (generally 1 week for the face, 2 weeks for the body).       Take Acetaminophen (Tylenol) for discomfort, if no contraindications.  Ibuprofen or aspirin could make bleeding worse.     Call our office immediately for signs of infection: fever, chills, increased redness, warmth, tenderness, discomfort/pain, or pus or foul smell coming from the wound.     WHAT TO DO IF THERE IS ANY BLEEDING?  If a small amount of bleeding is noticed, place a clean cloth over the area and apply firm pressure for ten minutes.  Check the wound after 10 minutes of direct pressure.  If bleeding persists, try one more time for an additional 10 minutes of direct pressure on the area.  If the bleeding becomes heavier or does not stop after the second attempt, or if you have any other questions about this procedure, then please call your [facility]'s Dermatologist by calling [phone] (SKIN).      I hereby acknowledge that I have reviewed and verified the site with my Dermatologist and have requested and authorized my Dermatologist to proceed with the procedure.     "

## 2024-09-25 PROCEDURE — 88341 IMHCHEM/IMCYTCHM EA ADD ANTB: CPT | Performed by: STUDENT IN AN ORGANIZED HEALTH CARE EDUCATION/TRAINING PROGRAM

## 2024-09-25 PROCEDURE — 88307 TISSUE EXAM BY PATHOLOGIST: CPT | Performed by: STUDENT IN AN ORGANIZED HEALTH CARE EDUCATION/TRAINING PROGRAM

## 2024-09-25 PROCEDURE — 88342 IMHCHEM/IMCYTCHM 1ST ANTB: CPT | Performed by: STUDENT IN AN ORGANIZED HEALTH CARE EDUCATION/TRAINING PROGRAM

## 2024-09-26 NOTE — RESULT ENCOUNTER NOTE
DERMATOPATHOLOGY RESULT NOTE    Results reviewed and discussed with Dr. Woodard. 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 Concurrent findings of trauma/irritation are seen. Because the base lesion was not well visualized, a more significant lesion could not be excluded. Conservative excision is recommended. Called patient and discussed results and treatment plan. Patient expressed understanding, all questions were answered. Message sent to  clinical to schedule.       Instructions for Clinical Derm Team:   (remember to route Result Note to appropriate staff):    Call patient and schedule for excision     Result & Plan by Specimen:    Specimen A: indeterminate  Plan: excision    [id number]  Order: [id number]   Status: Final result      Visible to patient: Yes (not seen)      Dx: Neoplasm of uncertain behavior of skin    0 Result Notes     Component   Case Report  Surgical Pathology Report                         Case: [id number]                                  Authorizing Provider:  Adali Tong PA-C     Collected:           09/19/2024 1011              Ordering Location:     [facility]      Received:            09/19/2024 1011                                     [place]                                                                Pathologist:           Steven Villaseñor MD                                                          Specimen:    Skin, Other, Specimen A: left lateral hip                                                Final Diagnosis  A. Skin, left lateral hip, shave biopsy:    Dermal spindle cell proliferation; transected (see note).    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 Concurrent findings of trauma/irritation are seen. Multiple levels examined. The lesional  "cells are positive for SMA immunostain, and they are negative for SOX10, CK AE1/AE3, desmin, CD31, and CD34 immunostains. Because the base of the lesion is not well visualized, a more significant lesion cannot be excluded. Conservative removal/excision is recommended to ensure against local persistence/recurrence.      Electronically signed by Steven Villaseñor MD on 9/25/2024 at  2:47 PM  Additional Information   All reported additional testing was performed with appropriately reactive controls.  These tests were developed and their performance characteristics determined by [facility] or appropriate performing facility, though some tests may be performed on tissues which have not been validated for performance characteristics (such as staining performed on alcohol exposed cell blocks and decalcified tissues).  Results should be interpreted with caution and in the context of the patients' clinical condition. These tests may not be cleared or approved by the U.S. Food and Drug Administration, though the FDA has determined that such clearance or approval is not necessary. These tests are used for clinical purposes and they should not be regarded as investigational or for research. This laboratory has been approved by CLIA 88, designated as a high-complexity laboratory and is qualified to perform these tests.  .  Gross Description   A. The specimen is received in formalin, labeled with the patient's name and hospital number, and is designated \" left lateral hip\".  The specimen consists of a shave biopsy of pale-tan white skin measuring 0.7 x 0.6 cm.  The epithelial surface exhibits a brown keratotic papule measuring 0.4 x 0.4 x 0.1 cm.  The skin surfaces inked red and the margin resection is inked green.  The specimen is bisected.  Entirely submitted. One cassette.  Between sponges    Note: The estimated total formalin fixation time based upon information provided by the submitting clinician and the " standard processing schedule is under 72 hours.      [place]  Clinical Information   ATTENTION:  DERMPATH GROUP    SPECIMEN A; Skin; Anatomic Location: left lateral hip; Procedure/Protocol: Skin Specimen (submit in [facility]):Tangential Biopsy (includes shave, scoop, saucerization, curette) (CPT 69750; each additional tangential biopsy is CPT 46740)  48 y.o. year old  Male with a Morphological Description: 0.4 cm x 0.4 cm firm pink papule with overlying crust  Differential Diagnosis and/or Specific Clinical Question: traumatized dermatofibroma  Resulting Agency [facility]          Specimen Collected: 09/19/24 10:11 AM Last Resulted: 09/25/24  2:47 PM

## 2024-10-08 ENCOUNTER — OFFICE VISIT (OUTPATIENT)
Dept: FAMILY MEDICINE CLINIC | Facility: CLINIC | Age: 49
End: 2024-10-08
Payer: COMMERCIAL

## 2024-10-08 VITALS
HEART RATE: 71 BPM | RESPIRATION RATE: 16 BRPM | BODY MASS INDEX: 29.4 KG/M2 | DIASTOLIC BLOOD PRESSURE: 70 MMHG | OXYGEN SATURATION: 98 % | WEIGHT: 210 LBS | HEIGHT: 71 IN | SYSTOLIC BLOOD PRESSURE: 130 MMHG

## 2024-10-08 DIAGNOSIS — Z23 NEEDS FLU SHOT: ICD-10-CM

## 2024-10-08 DIAGNOSIS — G47.09 OTHER INSOMNIA: ICD-10-CM

## 2024-10-08 DIAGNOSIS — R73.01 IMPAIRED FASTING BLOOD SUGAR: Primary | ICD-10-CM

## 2024-10-08 DIAGNOSIS — E78.2 MIXED HYPERLIPIDEMIA: ICD-10-CM

## 2024-10-08 PROBLEM — J20.9 ACUTE BRONCHITIS: Status: RESOLVED | Noted: 2018-06-19 | Resolved: 2024-10-08

## 2024-10-08 PROCEDURE — 99213 OFFICE O/P EST LOW 20 MIN: CPT | Performed by: FAMILY MEDICINE

## 2024-10-08 PROCEDURE — 90471 IMMUNIZATION ADMIN: CPT | Performed by: FAMILY MEDICINE

## 2024-10-08 PROCEDURE — 90656 IIV3 VACC NO PRSV 0.5 ML IM: CPT | Performed by: FAMILY MEDICINE

## 2024-10-08 NOTE — PROGRESS NOTES
Ambulatory Visit  Name: Shakeel Tate      : 1975      MRN: [id number]  Encounter Provider: Krista Whitaker MD  Encounter Date: 10/8/2024   Encounter department: [facility]    Assessment & Plan  Impaired fasting blood sugar  Past history of impaired glucose, advised to get labs done and follow-up in 2 weeks  Orders:    Comprehensive metabolic panel; Future    Hemoglobin A1C; Future    Lipid panel; Future    TSH, 3rd generation with Free T4 reflex; Future    Mixed hyperlipidemia  History of high cholesterol, will get the lab labs and follow-up  Orders:    CBC and differential; Future    Comprehensive metabolic panel; Future    Hemoglobin A1C; Future    Lipid panel; Future    TSH, 3rd generation with Free T4 reflex; Future    Other insomnia  Advised to start taking melatonin 10 mg at nighttime for few days to reset his sleep cycle and try to wake up early so then he has no difficulty in falling asleep  Orders:    CBC and differential; Future    Comprehensive metabolic panel; Future    TSH, 3rd generation with Free T4 reflex; Future    Needs flu shot    Orders:    influenza vaccine preservative-free 0.5 mL IM (Fluzone, Afluria, Fluarix, Flulaval)       F/u 2 wks   History of Present Illness     He says that for last few months he feels like he cannot fall asleep and he goes to bed 11:30 to 3:00 at night and then wake up at 8:00, he feels like he has difficulty to fall asleep, but he can maintain his sleep when he is sleeping.  He denies any excessive fatigue during daytime, no anxiety or depression, he feels slightly more stressed due to the job, he lives alone, he does not have any snoring or sleep apnea problems  He does not feel sleepy during daytime he only takes caffeine in the morning  works from home      Review of Systems   Constitutional: Negative.    HENT: Negative.     Eyes: Negative.    Respiratory: Negative.     Cardiovascular: Negative.    Psychiatric/Behavioral:  Positive  for sleep disturbance. Negative for decreased concentration and dysphoric mood. The patient is not nervous/anxious.      Past Medical History:   Diagnosis Date    Allergic rhinitis     Onset: 4/28/2008    Basal cell carcinoma 12/12/2023    left temporal hairline    Chronic lower back pain 12/29/2016    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/2012    Dermatitis 03/05/2018    Diabetes (HCC)     Onset: 4/14/2009    Golfers elbow, right 08/20/2019    Hordeolum externum unspecified eye, unspecified eyelid     Onset: 6/30/2009    IBS (irritable bowel syndrome)     Onset: 5/23/2008    Impaired fasting glucose     Onset: 4/28/2009    Plantar wart, left foot     Last Assessed: 12/29/2016    Plantar wart, right foot 08/20/2019    Recurrent cold sores 06/27/2018    Rosacea     Onset: 3/21/2006    Viral wart on finger 08/20/2019     Past Surgical History:   Procedure Laterality Date    MOHS SURGERY Left 03/12/2024    BCC left temporal hairline Dr. Ahuja     Family History   Problem Relation Age of Onset    Heart disease Mother     Diabetes Mother     Hypertension Mother     Hyperlipidemia Mother     Kidney failure Mother     Heart disease Father     Diabetes Father     Hyperlipidemia Father     Diabetes Sister     Diabetes Sister     Diabetes Sister      Social History     Tobacco Use    Smoking status: Never    Smokeless tobacco: Never   Vaping Use    Vaping status: Never Used   Substance and Sexual Activity    Alcohol use: Never    Drug use: Never    Sexual activity: Not on file     Current Outpatient Medications on File Prior to Visit   Medication Sig    albuterol (PROVENTIL HFA,VENTOLIN HFA) 90 mcg/act inhaler INHALE 2 PUFFS EVERY 6 HOURS AS NEEDED FOR WHEEZING    Multiple Vitamin (MULTIVITAMIN ADULT PO) Take by mouth     Allergies   Allergen Reactions    Penicillin G Abdominal Pain, Allergic Rhinitis, Anaphylaxis, Angioedema, Anxiety, Arthralgia, Blisters, Bradycardia, Chest Pain, Confusion, Cough, Delirium,  "Dermatitis, Diarrhea, Dizziness, Drowsiness, Edema, Eye Swelling, Facial Swelling, Fatigue, Fever, GI Bleeding, GI Intolerance, Hallucinations, Headache, Hemorrhagic stroke, Hives, Hyperactivity, Hypertension, Irritability, Itching, Lightheadedness, Lip Swelling, Myalgia, Nasal Congestion, Nausea Only, Other (See Comments), Palpitations, Photosensitivity, Rash, Seizures, Shortness Of Breath, Sneezing, Swelling, Syncope, Tachycardia, Throat Swelling, Tinnitus, Tongue Swelling, Tremor, Visual Disturbance, Vomiting and Wheezing    Penicillins      Reaction Date: 23May2008;  Unknown- infant allergy     Pollen Extract      Immunization History   Administered Date(s) Administered    COVID-19 PFIZER VACCINE 0.3 ML IM 03/23/2021, 04/14/2021, 12/07/2021    INFLUENZA 10/09/2015, 12/02/2016, 10/06/2017, 10/01/2018    Influenza Quadrivalent, 6-35 Months IM 10/09/2015, 12/02/2016, 10/06/2017    Influenza, injectable, quadrivalent, preservative free 0.5 mL 11/28/2022    Influenza, seasonal, injectable 10/28/2008, 09/25/2009    Influenza, seasonal, injectable, preservative free 10/08/2024    Td (adult), adsorbed 01/28/2011    Tdap 04/29/2021     Objective     /70   Pulse 71   Resp 16   Ht 5' 11\" (1.803 m)   Wt 95.3 kg (210 lb)   SpO2 98%   BMI 29.29 kg/m²     Physical Exam  Vitals and nursing note reviewed.   Constitutional:       Appearance: Normal appearance. He is not ill-appearing.   HENT:      Mouth/Throat:      Pharynx: No oropharyngeal exudate.   Cardiovascular:      Rate and Rhythm: Normal rate.      Heart sounds: No murmur heard.  Pulmonary:      Effort: Pulmonary effort is normal.   Musculoskeletal:      Cervical back: Normal range of motion.   Neurological:      General: No focal deficit present.   Psychiatric:         Mood and Affect: Mood normal.         "

## 2024-10-08 NOTE — ASSESSMENT & PLAN NOTE
History of high cholesterol, will get the lab labs and follow-up  Orders:    CBC and differential; Future    Comprehensive metabolic panel; Future    Hemoglobin A1C; Future    Lipid panel; Future    TSH, 3rd generation with Free T4 reflex; Future

## 2024-10-08 NOTE — ASSESSMENT & PLAN NOTE
Advised to start taking melatonin 10 mg at nighttime for few days to reset his sleep cycle and try to wake up early so then he has no difficulty in falling asleep  Orders:    CBC and differential; Future    Comprehensive metabolic panel; Future    TSH, 3rd generation with Free T4 reflex; Future

## 2024-10-08 NOTE — ASSESSMENT & PLAN NOTE
Past history of impaired glucose, advised to get labs done and follow-up in 2 weeks  Orders:    Comprehensive metabolic panel; Future    Hemoglobin A1C; Future    Lipid panel; Future    TSH, 3rd generation with Free T4 reflex; Future

## 2024-10-10 ENCOUNTER — APPOINTMENT (OUTPATIENT)
Dept: LAB | Facility: AMBULARY SURGERY CENTER | Age: 49
End: 2024-10-10
Payer: COMMERCIAL

## 2024-10-10 DIAGNOSIS — R73.01 IMPAIRED FASTING BLOOD SUGAR: ICD-10-CM

## 2024-10-10 DIAGNOSIS — G47.09 OTHER INSOMNIA: ICD-10-CM

## 2024-10-10 DIAGNOSIS — E78.2 MIXED HYPERLIPIDEMIA: ICD-10-CM

## 2024-10-10 LAB
ALBUMIN SERPL BCG-MCNC: 4.4 G/DL (ref 3.5–5)
ALP SERPL-CCNC: 54 U/L (ref 34–104)
ALT SERPL W P-5'-P-CCNC: 20 U/L (ref 7–52)
ANION GAP SERPL CALCULATED.3IONS-SCNC: 9 MMOL/L (ref 4–13)
AST SERPL W P-5'-P-CCNC: 15 U/L (ref 13–39)
BASOPHILS # BLD AUTO: 0.02 THOUSANDS/ΜL (ref 0–0.1)
BASOPHILS NFR BLD AUTO: 0 % (ref 0–1)
BILIRUB SERPL-MCNC: 1.33 MG/DL (ref 0.2–1)
BUN SERPL-MCNC: 16 MG/DL (ref 5–25)
CALCIUM SERPL-MCNC: 9.3 MG/DL (ref 8.4–10.2)
CHLORIDE SERPL-SCNC: 102 MMOL/L (ref 96–108)
CHOLEST SERPL-MCNC: 234 MG/DL
CO2 SERPL-SCNC: 29 MMOL/L (ref 21–32)
CREAT SERPL-MCNC: 0.98 MG/DL (ref 0.6–1.3)
EOSINOPHIL # BLD AUTO: 0.06 THOUSAND/ΜL (ref 0–0.61)
EOSINOPHIL NFR BLD AUTO: 1 % (ref 0–6)
ERYTHROCYTE [DISTWIDTH] IN BLOOD BY AUTOMATED COUNT: 13 % (ref 11.6–15.1)
EST. AVERAGE GLUCOSE BLD GHB EST-MCNC: 105 MG/DL
GFR SERPL CREATININE-BSD FRML MDRD: 91 ML/MIN/1.73SQ M
GLUCOSE P FAST SERPL-MCNC: 95 MG/DL (ref 65–99)
HBA1C MFR BLD: 5.3 %
HCT VFR BLD AUTO: 44 % (ref 36.5–49.3)
HDLC SERPL-MCNC: 36 MG/DL
HGB BLD-MCNC: 14 G/DL (ref 12–17)
IMM GRANULOCYTES # BLD AUTO: 0.04 THOUSAND/UL (ref 0–0.2)
IMM GRANULOCYTES NFR BLD AUTO: 1 % (ref 0–2)
LDLC SERPL CALC-MCNC: 152 MG/DL (ref 0–100)
LYMPHOCYTES # BLD AUTO: 1.33 THOUSANDS/ΜL (ref 0.6–4.47)
LYMPHOCYTES NFR BLD AUTO: 29 % (ref 14–44)
MCH RBC QN AUTO: 28.2 PG (ref 26.8–34.3)
MCHC RBC AUTO-ENTMCNC: 31.8 G/DL (ref 31.4–37.4)
MCV RBC AUTO: 89 FL (ref 82–98)
MONOCYTES # BLD AUTO: 0.44 THOUSAND/ΜL (ref 0.17–1.22)
MONOCYTES NFR BLD AUTO: 9 % (ref 4–12)
NEUTROPHILS # BLD AUTO: 2.77 THOUSANDS/ΜL (ref 1.85–7.62)
NEUTS SEG NFR BLD AUTO: 60 % (ref 43–75)
NONHDLC SERPL-MCNC: 198 MG/DL
NRBC BLD AUTO-RTO: 0 /100 WBCS
PLATELET # BLD AUTO: 168 THOUSANDS/UL (ref 149–390)
PMV BLD AUTO: 12.6 FL (ref 8.9–12.7)
POTASSIUM SERPL-SCNC: 4.1 MMOL/L (ref 3.5–5.3)
PROT SERPL-MCNC: 7.2 G/DL (ref 6.4–8.4)
RBC # BLD AUTO: 4.97 MILLION/UL (ref 3.88–5.62)
SODIUM SERPL-SCNC: 140 MMOL/L (ref 135–147)
TRIGL SERPL-MCNC: 232 MG/DL
TSH SERPL DL<=0.05 MIU/L-ACNC: 2.4 UIU/ML (ref 0.45–4.5)
WBC # BLD AUTO: 4.66 THOUSAND/UL (ref 4.31–10.16)

## 2024-10-10 PROCEDURE — 80061 LIPID PANEL: CPT

## 2024-10-10 PROCEDURE — 36415 COLL VENOUS BLD VENIPUNCTURE: CPT

## 2024-10-10 PROCEDURE — 80053 COMPREHEN METABOLIC PANEL: CPT

## 2024-10-10 PROCEDURE — 84443 ASSAY THYROID STIM HORMONE: CPT

## 2024-10-10 PROCEDURE — 83036 HEMOGLOBIN GLYCOSYLATED A1C: CPT

## 2024-10-10 PROCEDURE — 85025 COMPLETE CBC W/AUTO DIFF WBC: CPT

## 2024-10-24 ENCOUNTER — OFFICE VISIT (OUTPATIENT)
Dept: FAMILY MEDICINE CLINIC | Facility: CLINIC | Age: 49
End: 2024-10-24

## 2024-10-24 VITALS
HEART RATE: 64 BPM | OXYGEN SATURATION: 100 % | DIASTOLIC BLOOD PRESSURE: 70 MMHG | SYSTOLIC BLOOD PRESSURE: 120 MMHG | RESPIRATION RATE: 16 BRPM | BODY MASS INDEX: 28.42 KG/M2 | HEIGHT: 71 IN | WEIGHT: 203 LBS

## 2024-10-24 DIAGNOSIS — G47.09 OTHER INSOMNIA: ICD-10-CM

## 2024-10-24 DIAGNOSIS — E78.2 MIXED HYPERLIPIDEMIA: Primary | ICD-10-CM

## 2024-10-24 PROBLEM — R73.01 IMPAIRED FASTING BLOOD SUGAR: Status: RESOLVED | Noted: 2022-05-12 | Resolved: 2024-10-24

## 2024-10-24 PROBLEM — D69.6 LOW PLATELET COUNT (HCC): Status: RESOLVED | Noted: 2023-06-13 | Resolved: 2024-10-24

## 2024-10-24 NOTE — ASSESSMENT & PLAN NOTE
Doing better sleep hygiene and feeling improvement in his sleep and he did not try melatonin

## 2024-10-24 NOTE — PROGRESS NOTES
Ambulatory Visit  Name: Shakeel Tate      : 1975      MRN: [id number]  Encounter Provider: Krista Whitaker MD  Encounter Date: 10/24/2024   Encounter department: [facility]    Assessment & Plan  Mixed hyperlipidemia    Orders:    Lipid panel; Future  Lipids are getting worse, since he saw the result he started doing low-fat diet and doing more exercise and he already started losing weight, and he says he can improve his diet more, and repeat labs in 6-month  Other insomnia  Doing better sleep hygiene and feeling improvement in his sleep and he did not try melatonin              History of Present Illness     Follow-up, he says he is sleeping better as he tried to improve his sleep hygiene and did not take the melatonin,  He had labs, his cholesterol is high and he improved his diet and doing more exercise, he wants to improve with the diet and exercise      Review of Systems   Constitutional:  Negative for chills.   Respiratory:  Negative for shortness of breath and wheezing.    Cardiovascular:  Negative for chest pain, palpitations and leg swelling.   Gastrointestinal:  Negative for abdominal pain, constipation, diarrhea and nausea.   Genitourinary:  Negative for urgency.   Musculoskeletal:  Negative for arthralgias, back pain, myalgias and neck pain.   Neurological:  Negative for dizziness, weakness, light-headedness and headaches.   Hematological:  Negative for adenopathy. Does not bruise/bleed easily.   Psychiatric/Behavioral:  The patient is not nervous/anxious.      Past Medical History:   Diagnosis Date    Allergic rhinitis     Onset: 2008    Basal cell carcinoma 2023    left temporal hairline    Chronic lower back pain 2016    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    Dermatitis 2018    Diabetes (HCC)     Onset: 2009    Golfers elbow, right 2019    Hordeolum externum unspecified eye, unspecified eyelid     Onset: 2009    IBS  (irritable bowel syndrome)     Onset: 5/23/2008    Impaired fasting glucose     Onset: 4/28/2009    Plantar wart, left foot     Last Assessed: 12/29/2016    Plantar wart, right foot 08/20/2019    Recurrent cold sores 06/27/2018    Rosacea     Onset: 3/21/2006    Viral wart on finger 08/20/2019     Past Surgical History:   Procedure Laterality Date    MOHS SURGERY Left 03/12/2024    BCC left temporal hairline Dr. Ahuja     Family History   Problem Relation Age of Onset    Heart disease Mother     Diabetes Mother     Hypertension Mother     Hyperlipidemia Mother     Kidney failure Mother     Heart disease Father     Diabetes Father     Hyperlipidemia Father     Diabetes Sister     Diabetes Sister     Diabetes Sister      Social History     Tobacco Use    Smoking status: Never    Smokeless tobacco: Never   Vaping Use    Vaping status: Never Used   Substance and Sexual Activity    Alcohol use: Never    Drug use: Never    Sexual activity: Not on file     Current Outpatient Medications on File Prior to Visit   Medication Sig    albuterol (PROVENTIL HFA,VENTOLIN HFA) 90 mcg/act inhaler INHALE 2 PUFFS EVERY 6 HOURS AS NEEDED FOR WHEEZING    Multiple Vitamin (MULTIVITAMIN ADULT PO) Take by mouth     Allergies   Allergen Reactions    Penicillin G Abdominal Pain, Allergic Rhinitis, Anaphylaxis, Angioedema, Anxiety, Arthralgia, Blisters, Bradycardia, Chest Pain, Confusion, Cough, Delirium, Dermatitis, Diarrhea, Dizziness, Drowsiness, Edema, Eye Swelling, Facial Swelling, Fatigue, Fever, GI Bleeding, GI Intolerance, Hallucinations, Headache, Hemorrhagic stroke, Hives, Hyperactivity, Hypertension, Irritability, Itching, Lightheadedness, Lip Swelling, Myalgia, Nasal Congestion, Nausea Only, Other (See Comments), Palpitations, Photosensitivity, Rash, Seizures, Shortness Of Breath, Sneezing, Swelling, Syncope, Tachycardia, Throat Swelling, Tinnitus, Tongue Swelling, Tremor, Visual Disturbance, Vomiting and Wheezing    Penicillins   "    Reaction Date: 23May2008;  Unknown- infant allergy     Pollen Extract      Immunization History   Administered Date(s) Administered    COVID-19 PFIZER VACCINE 0.3 ML IM 03/23/2021, 04/14/2021, 12/07/2021    INFLUENZA 10/09/2015, 12/02/2016, 10/06/2017, 10/01/2018    Influenza Quadrivalent, 6-35 Months IM 10/09/2015, 12/02/2016, 10/06/2017    Influenza, injectable, quadrivalent, preservative free 0.5 mL 11/28/2022    Influenza, seasonal, injectable 10/28/2008, 09/25/2009    Influenza, seasonal, injectable, preservative free 10/08/2024    Td (adult), adsorbed 01/28/2011    Tdap 04/29/2021     Objective     /70   Pulse 64   Resp 16   Ht 5' 11\" (1.803 m)   Wt 92.1 kg (203 lb)   SpO2 100%   BMI 28.31 kg/m²     Physical Exam  Vitals and nursing note reviewed.   Constitutional:       Appearance: Normal appearance. He is not ill-appearing.   Cardiovascular:      Rate and Rhythm: Normal rate.      Heart sounds: No murmur heard.  Pulmonary:      Effort: Pulmonary effort is normal.   Abdominal:      General: Abdomen is flat.   Musculoskeletal:         General: Normal range of motion.      Cervical back: Normal range of motion.   Skin:     Coloration: Skin is not jaundiced.         "

## 2024-10-24 NOTE — ASSESSMENT & PLAN NOTE
Orders:    Lipid panel; Future  Lipids are getting worse, since he saw the result he started doing low-fat diet and doing more exercise and he already started losing weight, and he says he can improve his diet more, and repeat labs in 6-month

## 2025-01-17 ENCOUNTER — PROCEDURE VISIT (OUTPATIENT)
Dept: DERMATOLOGY | Facility: CLINIC | Age: 50
End: 2025-01-17
Payer: COMMERCIAL

## 2025-01-17 VITALS
HEART RATE: 64 BPM | OXYGEN SATURATION: 99 % | BODY MASS INDEX: 28.59 KG/M2 | WEIGHT: 205 LBS | DIASTOLIC BLOOD PRESSURE: 74 MMHG | TEMPERATURE: 97.9 F | SYSTOLIC BLOOD PRESSURE: 132 MMHG

## 2025-01-17 DIAGNOSIS — D23.9 DERMATOFIBROMA: Primary | ICD-10-CM

## 2025-01-17 PROCEDURE — 11400 EXC TR-EXT B9+MARG 0.5 CM<: CPT | Performed by: STUDENT IN AN ORGANIZED HEALTH CARE EDUCATION/TRAINING PROGRAM

## 2025-01-17 PROCEDURE — 88307 TISSUE EXAM BY PATHOLOGIST: CPT | Performed by: STUDENT IN AN ORGANIZED HEALTH CARE EDUCATION/TRAINING PROGRAM

## 2025-01-17 PROCEDURE — 88360 TUMOR IMMUNOHISTOCHEM/MANUAL: CPT | Performed by: STUDENT IN AN ORGANIZED HEALTH CARE EDUCATION/TRAINING PROGRAM

## 2025-01-17 PROCEDURE — 88341 IMHCHEM/IMCYTCHM EA ADD ANTB: CPT | Performed by: STUDENT IN AN ORGANIZED HEALTH CARE EDUCATION/TRAINING PROGRAM

## 2025-01-17 PROCEDURE — 12031 INTMD RPR S/A/T/EXT 2.5 CM/<: CPT | Performed by: STUDENT IN AN ORGANIZED HEALTH CARE EDUCATION/TRAINING PROGRAM

## 2025-01-17 PROCEDURE — 88342 IMHCHEM/IMCYTCHM 1ST ANTB: CPT | Performed by: STUDENT IN AN ORGANIZED HEALTH CARE EDUCATION/TRAINING PROGRAM

## 2025-01-17 NOTE — PROGRESS NOTES
PROCEDURE:  EXCISION WITH INTERMEDIATE LAYERED CLOSURE     Attending: Dr. Moya & Dr. Angela  Assistant: Erin Field     Pre-Op Diagnosis: dermatofibroma  Post-Op Diagnosis: Same   Benign versus Malignant benign       Lesion Anatomic Location: left lateral hip  Pre-op size: 0.4 cm x 0.3 cm  Final repaired wound length:  2.0 cm    Written and verbal, witnessed informed consent was obtained. I discussed that excision is a method of removing lesions both benign and malignant lesions.  A portion of normal skin is often taken to ensure completeness of removal.  I reviewed that procedure will include numbing the area,  cutting around and under defect, undermining tissue, and closing the wound with sutures both inside and out.  These sutures are usually removed in 7 to 14 days. Risks (bleeding, pain, infection, scarring, recurrence) and benefits discussed. It was discussed with patient that every effort is made to minimize scar, but scarring is influenced also by extrinsic factor such as location, age and genetics.     Time Out: performed:  yes  Correct patient: yes.   Correct site per Clinic Report: yes  Correct site per Patient Report: yes    LOCAL ANESTHESIA: 1% Lidocaine HCL    DESCRIPTION OF PROCEDURE: The patient was brought back into the procedure room, anesthetized locally, prepped and draped in the usual fashion. Using 8 mm punch tool, the lesion was excised to subcutaneous fat and dissected out. The wound was  undermined in the  fascial plane. Hemostasis was achieved with light electrocoagulation and suture ligation. Purpose of undermining was to decrease wound tension and facilitate closure.    The wound was closed with subcutaneous sutures as follows:    Deep suture:3-0 vicryl      Epidermal edge closure was accomplished with superficial sutures as follows:    Superficial suture: 3-0 ethilon  Superficial suture type simple interrupted     Estimated blood loss less than 3ml.    The patient tolerated the  procedure well without any complications. The wound was cleaned with sterile saline, dried off, surgical vaseline ointment was applied, and the wound was covered. A pressure dressing was applied for stabilization and light pressure. The patient was given detailed oral and written instructions on postoperative care as detailed in consent. The patient left in good medical condition.    POSTOP DISCUSSION DISCUSSION AND INSTRUCTIONS FOR PATIENT      Rationale for Procedure  A skin excision allows the dermatologist to remove a lesion. The procedure involves a local numbing medication and removing the entire lesion. Typically, the lesion is being removed because it is atypical, traumatized, or for significant appearance reasons.  The area will be open like a brush burn and allowed to heal.   There will be no sutures.Tissue is sent to pathologist who will reconfirm diagnosis and verify completeness of lesion removal.    Description of Procedure  We would like to perform a skin excision today.  A local anesthetic, similar to the kind that a dentist uses when filling a cavity, will be injected with a very small needle into the skin area to be sampled.  The injected skin and tissue underneath should “go to sleep” and become numbed so that no further pain should be felt.  A scalpel will be used to cut around the lesion and tissue will be submitted to pathology for examination.  Depending on the diagnosis the lesion will be excised with a certain amount of normal skin to help assure completeness of lesion removal.  The physician will discuss in advance the anticipated size and extent of removal.   Bleeding will occur, but it will stopped with direct pressure, sutures, and electrocautery.    Surgical “Vaseline-type” ointment will also applied after the procedure to help create a barrier between the wound and the outside world.      Risks and Potential Complications  The advantage of a skin excision is that it allows us to remove  a problem lesion quickly.  Although this usually permits the lesion to heal as soon as possible with the least scarring, there are some risks and potential complications that include but are not limited to the following:  Some bleeding is normal at time of procedure and some bleeding on gauze is normal  the first few days after surgery.  Profuse bleeding and bleeding with swelling and pain should be reported as detailed  below  Infection is uncommon in skin surgery.  Infection should be reported and is indicated by pain, redness, and discharge of purulent material.  Some dull to at time sharp pain could occur initially the day after surgery.  Persistent pain or increasing pain days after surgery is not expected and should be reported.  Every effort is made to minimize scar, but location, size, and genetics do play a role in scar appearance.  A surgical wound does not achieve its optimal appearance until 6 months.  There are several treatments available if scarring would be problematic including scar creams, silicone pad, laser and scar revision.  Skin discoloration can occur especially in people of color.  Its important to avoid sun on wound in first 6 months after surgery.  Treatment is available if pigment is problematic.  Incomplete removal of the lesion or recurrence of lesion can occur and this would then require further treatment and more surgery.  Nerve Damage/Numbness/Loss of Function is very rare, but is most likely to occur if lesion is large or if it is in a high risk location  Allergic Reaction to lidocaine is rare.  More commonly,  epinephrine is used  with the lidocaine.  Occasionally, epinephrine (aka adrenalin) may cause a brief  feeling of anxiety or jitteriness.  The person at the microscope  (pathologist) may provide additional information that was unexpected. This unexpected finding could provoke the need for additional treatment or evaluation.    What You Will Need to Do After the  Procedure  Keep the area clean and dry the first day. Try NOT to remove the bandage for the first day.  Gently clean the area with soap and water and apply Vaseline ointment (this is over the counter and not a prescription) to the excision  site for up to 2 weeks.    Apply a clean appropriately sized bandage to area.  Gauze and paper tape are recommended for sensitive skin.  Return for suture removal as instructed (generally 1 week for the face, 2 weeks for the body).  Take Acetaminophen (Tylenol) for discomfort, if no contraindications.  Do NOT take Ibuprofen or aspirin unless specifically told to do so by your Dermatologist because these medications can make bleeding worse.  Call our office immediately for signs of infection: fever, chills, increased redness, warmth, tenderness, discomfort/pain, or pus or foul smell coming from the wound.    If bleeding is noticed, place a clean cloth over the area and apply firm pressure for thirty minutes.  Check the wound ONLY after 30 minutes of direct pressure; do not cheat and sneak a peak, as that does not count.  If bleeding persists after 30 minutes of legitimate direct pressure, then try one more round of direct pressure for an additional 10 minutes to the area.  Should the bleeding become heavier or not stop after the second attempt, call [facility] directly at [phone] (SKIN) or, if after hours, go to your local Emergency Room/Emergency Department.     Scribe Attestation    I,:  Erin Field MA am acting as a scribe while in the presence of the attending physician.:       I,:  Jamie Angela DO personally performed the services described in this documentation    as scribed in my presence.:

## 2025-01-17 NOTE — PATIENT INSTRUCTIONS
POSTOP DISCUSSION DISCUSSION AND INSTRUCTIONS FOR PATIENT      Rationale for Procedure  A skin excision allows the dermatologist to remove a lesion. The procedure involves a local numbing medication and removing the entire lesion. Typically, the lesion is being removed because it is atypical, traumatized, or for significant appearance reasons.  The area will be open like a brush burn and allowed to heal.   There will be no sutures.Tissue is sent to pathologist who will reconfirm diagnosis and verify completeness of lesion removal.    Description of Procedure  We would like to perform a skin excision today.  A local anesthetic, similar to the kind that a dentist uses when filling a cavity, will be injected with a very small needle into the skin area to be sampled.  The injected skin and tissue underneath should “go to sleep” and become numbed so that no further pain should be felt.  A scalpel will be used to cut around the lesion and tissue will be submitted to pathology for examination.  Depending on the diagnosis the lesion will be excised with a certain amount of normal skin to help assure completeness of lesion removal.  The physician will discuss in advance the anticipated size and extent of removal.   Bleeding will occur, but it will stopped with direct pressure, sutures, and electrocautery.    Surgical “Vaseline-type” ointment will also applied after the procedure to help create a barrier between the wound and the outside world.      Risks and Potential Complications  The advantage of a skin excision is that it allows us to remove a problem lesion quickly.  Although this usually permits the lesion to heal as soon as possible with the least scarring, there are some risks and potential complications that include but are not limited to the following:  Some bleeding is normal at time of procedure and some bleeding on gauze is normal  the first few days after surgery.  Profuse bleeding and bleeding with swelling  and pain should be reported as detailed  below  Infection is uncommon in skin surgery.  Infection should be reported and is indicated by pain, redness, and discharge of purulent material.  Some dull to at time sharp pain could occur initially the day after surgery.  Persistent pain or increasing pain days after surgery is not expected and should be reported.  Every effort is made to minimize scar, but location, size, and genetics do play a role in scar appearance.  A surgical wound does not achieve its optimal appearance until 6 months.  There are several treatments available if scarring would be problematic including scar creams, silicone pad, laser and scar revision.  Skin discoloration can occur especially in people of color.  Its important to avoid sun on wound in first 6 months after surgery.  Treatment is available if pigment is problematic.  Incomplete removal of the lesion or recurrence of lesion can occur and this would then require further treatment and more surgery.  Nerve Damage/Numbness/Loss of Function is very rare, but is most likely to occur if lesion is large or if it is in a high risk location  Allergic Reaction to lidocaine is rare.  More commonly,  epinephrine is used  with the lidocaine.  Occasionally, epinephrine (aka adrenalin) may cause a brief  feeling of anxiety or jitteriness.  The person at the microscope  (pathologist) may provide additional information that was unexpected. This unexpected finding could provoke the need for additional treatment or evaluation.    What You Will Need to Do After the Procedure  Keep the area clean and dry the first day. Try NOT to remove the bandage for the first day.  Gently clean the area with soap and water and apply Vaseline ointment (this is over the counter and not a prescription) to the excision  site for up to 2 weeks.    Apply a clean appropriately sized bandage to area.  Gauze and paper tape are recommended for sensitive skin.  Return for suture  removal as instructed (generally 1 week for the face, 2 weeks for the body).  Take Acetaminophen (Tylenol) for discomfort, if no contraindications.  Do NOT take Ibuprofen or aspirin unless specifically told to do so by your Dermatologist because these medications can make bleeding worse.  Call our office immediately for signs of infection: fever, chills, increased redness, warmth, tenderness, discomfort/pain, or pus or foul smell coming from the wound.    If bleeding is noticed, place a clean cloth over the area and apply firm pressure for thirty minutes.  Check the wound ONLY after 30 minutes of direct pressure; do not cheat and sneak a peak, as that does not count.  If bleeding persists after 30 minutes of legitimate direct pressure, then try one more round of direct pressure for an additional 10 minutes to the area.  Should the bleeding become heavier or not stop after the second attempt, call [facility] directly at [phone] (SKIN) or, if after hours, go to your local Emergency Room/Emergency Department.

## 2025-01-22 PROCEDURE — 88307 TISSUE EXAM BY PATHOLOGIST: CPT | Performed by: STUDENT IN AN ORGANIZED HEALTH CARE EDUCATION/TRAINING PROGRAM

## 2025-01-22 PROCEDURE — 88342 IMHCHEM/IMCYTCHM 1ST ANTB: CPT | Performed by: STUDENT IN AN ORGANIZED HEALTH CARE EDUCATION/TRAINING PROGRAM

## 2025-01-22 PROCEDURE — 88341 IMHCHEM/IMCYTCHM EA ADD ANTB: CPT | Performed by: STUDENT IN AN ORGANIZED HEALTH CARE EDUCATION/TRAINING PROGRAM

## 2025-01-23 PROCEDURE — 88360 TUMOR IMMUNOHISTOCHEM/MANUAL: CPT | Performed by: STUDENT IN AN ORGANIZED HEALTH CARE EDUCATION/TRAINING PROGRAM

## 2025-01-31 ENCOUNTER — RESULTS FOLLOW-UP (OUTPATIENT)
Dept: DERMATOLOGY | Facility: CLINIC | Age: 50
End: 2025-01-31

## 2025-01-31 ENCOUNTER — CLINICAL SUPPORT (OUTPATIENT)
Dept: DERMATOLOGY | Facility: CLINIC | Age: 50
End: 2025-01-31

## 2025-01-31 DIAGNOSIS — Z48.02 ENCOUNTER FOR REMOVAL OF SUTURES: Primary | ICD-10-CM

## 2025-01-31 PROCEDURE — RECHECK: Performed by: DERMATOLOGY

## 2025-01-31 NOTE — PROGRESS NOTES
"Suture removal    Date/Time: 1/31/2025 1:30 PM    Performed by: Kami Colon RN  Authorized by: Fide Pacheco MD  Universal Protocol:  procedure performed by consultantConsent: Verbal consent obtained. Written consent not obtained.  Risks and benefits: risks, benefits and alternatives were discussed  Consent given by: patient  Time out: Immediately prior to procedure a \"time out\" was called to verify the correct patient, procedure, equipment, support staff and site/side marked as required.  Timeout called at: 1/31/2025 1:20 PM.  Patient understanding: patient states understanding of the procedure being performed  Patient consent: the patient's understanding of the procedure matches consent given  Procedure consent: procedure consent matches procedure scheduled  Relevant documents: relevant documents not present or verified  Test results: test results not available  Site marked: the operative site was not marked  Radiology Images displayed and confirmed. If images not available, report reviewed: imaging studies not available  Patient identity confirmed: verbally with patient      Patient location:  Clinic  Location:     Laterality:  Left    Location:  Lower extremity    Lower extremity location:  Hip    Hip location:  L hip (lateral)  Procedure details:     Tools used:  Suture removal kit    Wound appearance:  No sign(s) of infection, good wound healing, clean, moist and pink    Number of sutures removed:  3  Post-procedure details:     Post-removal:  Band-Aid applied (Vaseline applied)    Patient tolerance of procedure:  Tolerated well, no immediate complications    Before suture removal     After suture removal     "

## 2025-01-31 NOTE — RESULT ENCOUNTER NOTE
DERMATOPATHOLOGY RESULT NOTE    Results reviewed by ordering physician.  Called patient to personally discuss results. Discussed results with patient.       Instructions for Clinical Derm Team:   (remember to route Result Note to appropriate staff):    None    Result & Plan by Specimen:    Specimen A: benign  Plan: margins clear            Tissue Exam: [id number]  Order: [id number]   Status: Edited Result - FINAL      Dx: Dermatofibroma    Test Result Released: Yes (not seen)    0 Result Notes     Component  Ref Range & Units (hover)   Case Report  Surgical Pathology Report                         Case: [id number]                                  Authorizing Provider:  Ian Moya MD            Collected:           01/17/2025 1418              Ordering Location:     [facility]      Received:            01/17/2025 1419                                     [place]                                                                Pathologist:           Steven Villaseñor MD                                                          Specimen:    Skin, Other, A: left lateral hip                                                        Addendum  The lesional cells are negative for SOX10, CK AE1/AE3, and CD31 immunostains, and they are focally (approximately 30%) positive for desmin immunostain. The final diagnosis remains unchanged.  Addendum electronically signed by Steven Villaseñor MD on 1/23/2025 at 1612 EST  Final Diagnosis  A. Skin, left lateral hip, punch excision:    Residual CELLULAR FIBROUS HISTIOCYTOMA (DERMATOFIBROMA); not seen at examined inked specimen margins.        Electronically signed by Steven Villaseñor MD on 1/22/2025 at 1556 EST  Additional Information   All reported additional testing was performed with appropriately reactive controls.  These tests were developed and their performance characteristics determined by [facility] or appropriate performing facility, though some  "tests may be performed on tissues which have not been validated for performance characteristics (such as staining performed on alcohol exposed cell blocks and decalcified tissues).  Results should be interpreted with caution and in the context of the patients' clinical condition. These tests may not be cleared or approved by the U.S. Food and Drug Administration, though the FDA has determined that such clearance or approval is not necessary. These tests are used for clinical purposes and they should not be regarded as investigational or for research. This laboratory has been approved by [place] 88, designated as a high-complexity laboratory and is qualified to perform these tests.  .  Gross Description   A. The specimen is received in formalin, labeled with the patient's name and hospital number, and is designated \" left lateral hip\".  The specimen consists of a tan portion of skin measuring 0.7 to 0.8 cm in diameter, excised to a depth of 0.6 cm.  The skin surface appears erythematous and somewhat puckered.  The margin is inked green.  The skin surface is inked red.  The specimen is bisected.  Entirely submitted. One cassette.  Between sponges.    Note: The estimated total formalin fixation time based upon information provided by the submitting clinician and the standard processing schedule is under 72 hours.    [facility]  Clinical Information   ATTENTION:  DERMPATH GROUP    SPECIMEN A; Skin; Anatomic Location: left lateral hip; Procedure/Protocol: Skin Specimen (submit in FORMALIN): Punch excision (into sub-cutaneous fat)  49 y.o. year old  Male with a Morphological Description: 0.4 cm x 0.3 cm dermatofibroma  Differential Diagnosis and/or Specific Clinical Question: ddx: biopsy proven dermatofibroma  Any Previous Pathology for Dermatopathologist to Review: [facility] Accession #: [id number]  Resulting Agency [id number]          Specimen Collected: 01/17/25  2:18 PM Last Resulted: 01/23/25  4:12 PM      "

## 2025-03-26 ENCOUNTER — OFFICE VISIT (OUTPATIENT)
Dept: DERMATOLOGY | Facility: CLINIC | Age: 50
End: 2025-03-26
Payer: COMMERCIAL

## 2025-03-26 VITALS — WEIGHT: 205 LBS | BODY MASS INDEX: 28.59 KG/M2

## 2025-03-26 DIAGNOSIS — D22.72 MULTIPLE BENIGN MELANOCYTIC NEVI OF UPPER AND LOWER EXTREMITIES AND TRUNK: ICD-10-CM

## 2025-03-26 DIAGNOSIS — Z85.828 HISTORY OF BASAL CELL CARCINOMA (BCC): Primary | ICD-10-CM

## 2025-03-26 DIAGNOSIS — D18.01 CHERRY ANGIOMA: ICD-10-CM

## 2025-03-26 DIAGNOSIS — D22.71 MULTIPLE BENIGN MELANOCYTIC NEVI OF UPPER AND LOWER EXTREMITIES AND TRUNK: ICD-10-CM

## 2025-03-26 DIAGNOSIS — D22.62 MULTIPLE BENIGN MELANOCYTIC NEVI OF UPPER AND LOWER EXTREMITIES AND TRUNK: ICD-10-CM

## 2025-03-26 DIAGNOSIS — L57.8 OTHER SKIN CHANGES DUE TO CHRONIC EXPOSURE TO NONIONIZING RADIATION: ICD-10-CM

## 2025-03-26 DIAGNOSIS — L81.4 LENTIGO: ICD-10-CM

## 2025-03-26 DIAGNOSIS — D22.61 MULTIPLE BENIGN MELANOCYTIC NEVI OF UPPER AND LOWER EXTREMITIES AND TRUNK: ICD-10-CM

## 2025-03-26 DIAGNOSIS — D22.5 MULTIPLE BENIGN MELANOCYTIC NEVI OF UPPER AND LOWER EXTREMITIES AND TRUNK: ICD-10-CM

## 2025-03-26 PROCEDURE — 99213 OFFICE O/P EST LOW 20 MIN: CPT | Performed by: DERMATOLOGY

## 2025-03-26 NOTE — PROGRESS NOTES
"[facility] Clinic Note     Patient Name: Shakeel Tate  Encounter Date: 3/26/25     Have you been cared for by a [facility] Dermatologist in the last 3 years and, if so, which description applies to you?    Yes.  I have been here within the last 3 years, and my medical history has NOT changed since that time.  I am MALE/not capable of bearing children.    REVIEW OF SYSTEMS:  Have you recently had or currently have any of the following? No changes in my recent health.   PAST MEDICAL HISTORY:  Have you personally ever had or currently have any of the following?  If \"YES,\" then please provide more detail. No changes in my medical history.   HISTORY OF IMMUNOSUPPRESSION: Do you have a history of any of the following:  Systemic Immunosuppression such as Diabetes, Biologic or Immunotherapy, Chemotherapy, Organ Transplantation, Bone Marrow Transplantation or Prednisone?  No     Answering \"YES\" requires the addition of the dotphrase \"IMMUNOSUPPRESSED\" as the first diagnosis of the patient's visit.   FAMILY HISTORY:  Any \"first degree relatives\" (parent, brother, sister, or child) with the following?    No changes in my family's known health.   PATIENT EXPERIENCE:    Do you want the Dermatologist to perform a COMPLETE skin exam today including a clinical examination under the \"bra and underwear\" areas?  Yes  If necessary, do we have your permission to call and leave a detailed message on your Preferred Phone number that includes your specific medical information?  Yes      Allergies   Allergen Reactions    Penicillin G Abdominal Pain, Allergic Rhinitis, Anaphylaxis, Angioedema, Anxiety, Arthralgia, Blisters, Bradycardia, Chest Pain, Confusion, Cough, Delirium, Dermatitis, Diarrhea, Dizziness, Drowsiness, Edema, Eye Swelling, Facial Swelling, Fatigue, Fever, GI Bleeding, GI Intolerance, Hallucinations, Headache, Hemorrhagic stroke, Hives, Hyperactivity, Hypertension, Irritability, Itching, Lightheadedness, Lip " Swelling, Myalgia, Nasal Congestion, Nausea Only, Other (See Comments), Palpitations, Photosensitivity, Rash, Seizures, Shortness Of Breath, Sneezing, Swelling, Syncope, Tachycardia, Throat Swelling, Tinnitus, Tongue Swelling, Tremor, Visual Disturbance, Vomiting and Wheezing    Penicillins      Reaction Date: 23May2008;  Unknown- infant allergy     Pollen Extract       Current Outpatient Medications:     albuterol (PROVENTIL HFA,VENTOLIN HFA) 90 mcg/act inhaler, INHALE 2 PUFFS EVERY 6 HOURS AS NEEDED FOR WHEEZING, Disp: 8.5 g, Rfl: 5    Multiple Vitamin (MULTIVITAMIN ADULT PO), Take by mouth, Disp: , Rfl:           Whom besides the patient is providing clinical information about today's encounter?   NO ADDITIONAL HISTORIAN (patient alone provided history)    Physical Exam and Assessment/Plan by Diagnosis:  HISTORY OF BASAL CELL CARCINOMA     Physical Exam:  Anatomic Location Affected:  left temporal hairline  Morphological Description of scar:  well healed  Suspected Recurrence: No        Additional History of Present Condition:  History of basal cell carcinoma with no sign of recurrence. Treated with Mohs surgery by Dr. Ahuja on 3/12/24. Patient is getting 6 month skin check and would like to continue to get them.        CHERRY ANGIOMAS     Physical Exam:  Anatomic Location Affected:  Trunk and extremities  Morphological Description:  Scattered cherry red papules  Denies pain, itch, bleeding. No treatments tried. Present for years. Present constantly; no modifying factors which make it worse or better.     Assessment and Plan:  Based on a thorough discussion of this condition and the management approach to it (including a comprehensive discussion of the known risks, side effects and potential benefits of treatment), the patient (family) agrees to implement the following specific plan:  Reassure benign     SOLAR LENTIGINES   OTHER SKIN CHANGES DUE TO CHRONIC EXPOSURE TO NONIONIZING RADIATION     Physical  "Exam:  Anatomic Location Affected:  Sun exposed areas of back, chest, arms, legs  Morphological Description:  Multiple scattered brown to tan evenly pigmented macules   Denies pain, itch, bleeding. No treatments tried. Present for months - years. Reports getting newer lesions with sun exposure.         Assessment and Plan:  Based on a thorough discussion of this condition and the management approach to it (including a comprehensive discussion of the known risks, side effects and potential benefits of treatment), the patient (family) agrees to implement the following specific plan:  Reassure benign  Use sun protection.  Apply SPF 30 or higher at least three times a day.  Wear sun protecting clothing and hats.         MULTIPLE MELANOCYTIC NEVI (\"Moles\")     Physical Exam:  Anatomic Location Affected: Trunk and extremities  Morphological Description:  Scattered, round to ovoid, symmetrical-appearing, even bordered, skin colored to dark brown macules/papules  Denies pain, itch, bleeding. No treatments tried. Present for years. Present constantly; no modifying factors which make it worse or better. Denies actively changing or growing moles.      Assessment and Plan:  Based on a thorough discussion of this condition and the management approach to it (including a comprehensive discussion of the known risks, side effects and potential benefits of treatment), the patient (family) agrees to implement the following specific plan:  Reassure benign  Monitor for changes  Use sun protection.  Apply SPF 30 or higher at least three times a day.  Wear sun protecting clothing and hats.       Worrisome signs of skin malignancy discussed, questions answered. Regular self-skin check discussed. Advised to call or return to office if patient notices any spots of concern, rapidly growing/changing lesions, bleeding lesions, non-healing lesions. Advised regular SPF use.      Scribe Attestation      I,:  Larisa Fernandez MA am acting as a scribe " while in the presence of the attending physician.:       I,:  Dustin Oneill MD personally performed the services described in this documentation    as scribed in my presence.:

## 2025-06-16 ENCOUNTER — APPOINTMENT (OUTPATIENT)
Dept: RADIOLOGY | Facility: CLINIC | Age: 50
End: 2025-06-16
Payer: COMMERCIAL

## 2025-06-16 ENCOUNTER — OFFICE VISIT (OUTPATIENT)
Dept: FAMILY MEDICINE CLINIC | Facility: CLINIC | Age: 50
End: 2025-06-16
Payer: COMMERCIAL

## 2025-06-16 VITALS
OXYGEN SATURATION: 99 % | HEART RATE: 82 BPM | HEIGHT: 71 IN | RESPIRATION RATE: 16 BRPM | DIASTOLIC BLOOD PRESSURE: 88 MMHG | BODY MASS INDEX: 29.4 KG/M2 | SYSTOLIC BLOOD PRESSURE: 126 MMHG | WEIGHT: 210 LBS

## 2025-06-16 DIAGNOSIS — S99.922A FOOT INJURY, LEFT, INITIAL ENCOUNTER: ICD-10-CM

## 2025-06-16 DIAGNOSIS — J45.20 MILD INTERMITTENT ASTHMA WITHOUT COMPLICATION: ICD-10-CM

## 2025-06-16 DIAGNOSIS — E78.2 MIXED HYPERLIPIDEMIA: ICD-10-CM

## 2025-06-16 DIAGNOSIS — H93.12 TINNITUS AURIUM, LEFT: ICD-10-CM

## 2025-06-16 DIAGNOSIS — J30.1 SEASONAL ALLERGIC RHINITIS DUE TO POLLEN: Primary | ICD-10-CM

## 2025-06-16 PROCEDURE — 99214 OFFICE O/P EST MOD 30 MIN: CPT | Performed by: FAMILY MEDICINE

## 2025-06-16 PROCEDURE — 73630 X-RAY EXAM OF FOOT: CPT

## 2025-06-16 RX ORDER — ALBUTEROL SULFATE 90 UG/1
2 INHALANT RESPIRATORY (INHALATION) EVERY 6 HOURS PRN
Qty: 8.5 G | Refills: 5 | Status: SHIPPED | OUTPATIENT
Start: 2025-06-16

## 2025-06-16 RX ORDER — MOMETASONE FUROATE MONOHYDRATE 50 UG/1
2 SPRAY, METERED NASAL DAILY
Qty: 17 G | Refills: 3 | Status: SHIPPED | OUTPATIENT
Start: 2025-06-16

## 2025-06-16 NOTE — ASSESSMENT & PLAN NOTE
Educated patient about the importance of taking Nasonex nasal spray daily and consistently for the next 2 weeks to see if this resolves the tinnitus in his left ear. Otherwise, allergies are well-controlled.   Orders:  •  mometasone (Nasonex) 50 mcg/act nasal spray; 2 sprays into each nostril daily

## 2025-06-16 NOTE — PROGRESS NOTES
Name: Shakeel Tate      : 1975      MRN: [id number]  Encounter Provider: Krista Whitaker MD  Encounter Date: 2025   Encounter department: [facility]  :  Assessment & Plan  Seasonal allergic rhinitis due to pollen  Educated patient about the importance of taking Nasonex nasal spray daily and consistently for the next 2 weeks to see if this resolves the tinnitus in his left ear. Otherwise, allergies are well-controlled.   Orders:  •  mometasone (Nasonex) 50 mcg/act nasal spray; 2 sprays into each nostril daily    Mild intermittent asthma without complication  Patient reports intermittent wheezing, particularly with allergy exacerbation. He denies daily need for albuterol inhaler. Recommend continuing albuterol as needed for wheezing.   Orders:  •  albuterol (PROVENTIL HFA,VENTOLIN HFA) 90 mcg/act inhaler; Inhale 2 puffs every 6 (six) hours as needed for wheezing    Mixed hyperlipidemia  Coronary calc score in  was 0. Patient is currently not on a statin. Last lipid panel in 2024 revealed total cholesterol 234, triglycerides 232, and . Ordered lipid panel for patient to get today.        Tinnitus aurium, left  Has been going on for the past 3 months intermittently. Denies any accompanying hearing loss or vertigo. Denies exacerbation with exercise. Recommend referral to ENT to rule out any complications.   Orders:  •  Ambulatory Referral to Otolaryngology; Future    Foot injury, left, initial encounter  Patient reports dropping a heavy object on his left forefoot. Residual pain with long activity. Ordered an X-ray of the foot. Tender on base of 3rd and 4th MTP  Foot range of motion normal  Orders:  •  XR foot 3+ vw left; Future          Depression Screening and Follow-up Plan: Patient was screened for depression during today's encounter. They screened negative with a PHQ-2 score of 0.        History of Present Illness   Maximo is a 49 year old male who is following up  "after 6 months regarding his hyperlipidemia and insomnia. Patient did not go for his most recent labs. He reports dropping a heavy object on his foot about 2 months ago, causing residual pain with certain movements and after long periods of activity. This has limited his physical activity. He denies any current sleep difficulties. Patient reports tinnitus in his left ear for the past 3 months, but denies any accompanying hearing loss or vertigo.       Review of Systems   Constitutional:  Negative for chills and fever.   HENT:  Positive for tinnitus (left ear, 3 months). Negative for ear pain and sore throat.    Eyes:  Negative for pain and visual disturbance.   Respiratory:  Positive for wheezing (ocassional). Negative for cough and shortness of breath.    Cardiovascular:  Negative for chest pain and palpitations.   Gastrointestinal:  Negative for abdominal pain and vomiting.   Genitourinary:  Negative for dysuria and hematuria.   Musculoskeletal:  Negative for arthralgias and back pain.        Left forefoot pain    Skin:  Negative for color change and rash.   Neurological:  Negative for seizures and syncope.   All other systems reviewed and are negative.      Objective   /88   Pulse 82   Resp 16   Ht 5' 11\" (1.803 m)   Wt 95.3 kg (210 lb)   SpO2 99%   BMI 29.29 kg/m²      Physical Exam  Vitals and nursing note reviewed.   Constitutional:       General: He is not in acute distress.     Appearance: Normal appearance.   HENT:      Head: Normocephalic and atraumatic.      Right Ear: Tympanic membrane and ear canal normal.      Left Ear: Tympanic membrane and ear canal normal.      Nose: Nose normal. No rhinorrhea.      Mouth/Throat:      Mouth: Mucous membranes are moist.      Pharynx: Oropharynx is clear. No oropharyngeal exudate or posterior oropharyngeal erythema.     Eyes:      Extraocular Movements: Extraocular movements intact.      Conjunctiva/sclera: Conjunctivae normal.      Pupils: Pupils are " equal, round, and reactive to light.       Cardiovascular:      Rate and Rhythm: Normal rate and regular rhythm.      Heart sounds: No murmur heard.  Pulmonary:      Effort: Pulmonary effort is normal.      Breath sounds: Normal breath sounds. No wheezing or rales.   Abdominal:      General: Abdomen is flat. Bowel sounds are normal. There is no distension.      Palpations: Abdomen is soft. There is no mass.      Tenderness: There is no abdominal tenderness. There is no guarding.     Musculoskeletal:         General: No swelling, tenderness, deformity or signs of injury. Normal range of motion.      Cervical back: Normal range of motion and neck supple.      Left foot: Bony tenderness (Tenderness at base of third and fourth digit) present. No swelling.      Comments: Small scab on left forefoot at the site of injury.      Skin:     Coloration: Skin is not jaundiced or pale.      Findings: No rash.      Comments: Webbed toes and fingers     Neurological:      General: No focal deficit present.      Mental Status: He is alert and oriented to person, place, and time.      Motor: No weakness.     Psychiatric:         Mood and Affect: Mood normal.         Behavior: Behavior normal.         Thought Content: Thought content normal.         Judgment: Judgment normal.

## 2025-06-16 NOTE — ASSESSMENT & PLAN NOTE
Patient reports dropping a heavy object on his left forefoot. Residual pain with long activity. Ordered an X-ray of the foot. Tender on base of 3rd and 4th MTP  Foot range of motion normal  Orders:  •  XR foot 3+ vw left; Future

## 2025-06-16 NOTE — ASSESSMENT & PLAN NOTE
Coronary calc score in 2021 was 0. Patient is currently not on a statin. Last lipid panel in October 2024 revealed total cholesterol 234, triglycerides 232, and . Ordered lipid panel for patient to get today.

## 2025-06-16 NOTE — ASSESSMENT & PLAN NOTE
Has been going on for the past 3 months intermittently. Denies any accompanying hearing loss or vertigo. Denies exacerbation with exercise. Recommend referral to ENT to rule out any complications.   Orders:  •  Ambulatory Referral to Otolaryngology; Future

## 2025-06-16 NOTE — ASSESSMENT & PLAN NOTE
Patient reports intermittent wheezing, particularly with allergy exacerbation. He denies daily need for albuterol inhaler. Recommend continuing albuterol as needed for wheezing.   Orders:  •  albuterol (PROVENTIL HFA,VENTOLIN HFA) 90 mcg/act inhaler; Inhale 2 puffs every 6 (six) hours as needed for wheezing

## 2025-06-17 ENCOUNTER — RESULTS FOLLOW-UP (OUTPATIENT)
Dept: FAMILY MEDICINE CLINIC | Facility: CLINIC | Age: 50
End: 2025-06-17

## 2025-06-17 DIAGNOSIS — S99.922A FOOT INJURY, LEFT, INITIAL ENCOUNTER: Primary | ICD-10-CM

## 2025-08-12 ENCOUNTER — OFFICE VISIT (OUTPATIENT)
Dept: URGENT CARE | Facility: CLINIC | Age: 50
End: 2025-08-12
Payer: COMMERCIAL